# Patient Record
Sex: MALE | Race: ASIAN | NOT HISPANIC OR LATINO | Employment: FULL TIME | URBAN - METROPOLITAN AREA
[De-identification: names, ages, dates, MRNs, and addresses within clinical notes are randomized per-mention and may not be internally consistent; named-entity substitution may affect disease eponyms.]

---

## 2017-01-05 ENCOUNTER — COMMUNICATION - HEALTHEAST (OUTPATIENT)
Dept: INTERNAL MEDICINE | Facility: CLINIC | Age: 52
End: 2017-01-05

## 2017-01-05 DIAGNOSIS — J45.21 MILD INTERMITTENT ASTHMA WITH EXACERBATION: ICD-10-CM

## 2017-04-18 ENCOUNTER — COMMUNICATION - HEALTHEAST (OUTPATIENT)
Dept: INTERNAL MEDICINE | Facility: CLINIC | Age: 52
End: 2017-04-18

## 2017-04-18 DIAGNOSIS — J45.21 MILD INTERMITTENT ASTHMA WITH EXACERBATION: ICD-10-CM

## 2017-04-20 ENCOUNTER — COMMUNICATION - HEALTHEAST (OUTPATIENT)
Dept: INTERNAL MEDICINE | Facility: CLINIC | Age: 52
End: 2017-04-20

## 2017-07-14 ENCOUNTER — COMMUNICATION - HEALTHEAST (OUTPATIENT)
Dept: INTERNAL MEDICINE | Facility: CLINIC | Age: 52
End: 2017-07-14

## 2017-07-14 DIAGNOSIS — J45.21 MILD INTERMITTENT ASTHMA WITH EXACERBATION: ICD-10-CM

## 2017-10-02 ENCOUNTER — OFFICE VISIT - HEALTHEAST (OUTPATIENT)
Dept: INTERNAL MEDICINE | Facility: CLINIC | Age: 52
End: 2017-10-02

## 2017-10-02 ENCOUNTER — COMMUNICATION - HEALTHEAST (OUTPATIENT)
Dept: INTERNAL MEDICINE | Facility: CLINIC | Age: 52
End: 2017-10-02

## 2017-10-02 DIAGNOSIS — R42 DIZZINESS: ICD-10-CM

## 2017-10-02 DIAGNOSIS — Z00.00 HEALTHCARE MAINTENANCE: ICD-10-CM

## 2017-10-02 DIAGNOSIS — J45.909 ASTHMA: ICD-10-CM

## 2017-10-02 LAB
CHOLEST SERPL-MCNC: 192 MG/DL
FASTING STATUS PATIENT QL REPORTED: YES
HDLC SERPL-MCNC: 42 MG/DL
LDLC SERPL CALC-MCNC: 112 MG/DL
TRIGL SERPL-MCNC: 190 MG/DL

## 2017-10-02 ASSESSMENT — MIFFLIN-ST. JEOR: SCORE: 1571.18

## 2017-12-05 ENCOUNTER — COMMUNICATION - HEALTHEAST (OUTPATIENT)
Dept: INTERNAL MEDICINE | Facility: CLINIC | Age: 52
End: 2017-12-05

## 2017-12-05 DIAGNOSIS — J45.21 MILD INTERMITTENT ASTHMA WITH EXACERBATION: ICD-10-CM

## 2018-02-07 ENCOUNTER — COMMUNICATION - HEALTHEAST (OUTPATIENT)
Dept: INTERNAL MEDICINE | Facility: CLINIC | Age: 53
End: 2018-02-07

## 2018-02-07 DIAGNOSIS — J45.21 MILD INTERMITTENT ASTHMA WITH EXACERBATION: ICD-10-CM

## 2018-02-14 ENCOUNTER — COMMUNICATION - HEALTHEAST (OUTPATIENT)
Dept: INTERNAL MEDICINE | Facility: CLINIC | Age: 53
End: 2018-02-14

## 2018-02-14 DIAGNOSIS — J30.9 ALLERGIC RHINITIS: ICD-10-CM

## 2018-02-14 RX ORDER — CETIRIZINE HYDROCHLORIDE 10 MG/1
TABLET ORAL
Qty: 30 TABLET | Refills: 7 | Status: SHIPPED | OUTPATIENT
Start: 2018-02-14 | End: 2022-03-30

## 2018-08-02 ENCOUNTER — COMMUNICATION - HEALTHEAST (OUTPATIENT)
Dept: INTERNAL MEDICINE | Facility: CLINIC | Age: 53
End: 2018-08-02

## 2018-08-02 DIAGNOSIS — J45.21 MILD INTERMITTENT ASTHMA WITH EXACERBATION: ICD-10-CM

## 2018-11-07 ENCOUNTER — COMMUNICATION - HEALTHEAST (OUTPATIENT)
Dept: INTERNAL MEDICINE | Facility: CLINIC | Age: 53
End: 2018-11-07

## 2018-11-07 DIAGNOSIS — J45.21 MILD INTERMITTENT ASTHMA WITH EXACERBATION: ICD-10-CM

## 2018-11-09 RX ORDER — ALBUTEROL SULFATE 90 UG/1
AEROSOL, METERED RESPIRATORY (INHALATION)
Qty: 18 G | Refills: 1 | Status: SHIPPED | OUTPATIENT
Start: 2018-11-09 | End: 2022-01-06

## 2019-04-22 ENCOUNTER — COMMUNICATION - HEALTHEAST (OUTPATIENT)
Dept: INTERNAL MEDICINE | Facility: CLINIC | Age: 54
End: 2019-04-22

## 2019-04-22 DIAGNOSIS — J45.21 MILD INTERMITTENT ASTHMA WITH EXACERBATION: ICD-10-CM

## 2019-11-04 ENCOUNTER — OFFICE VISIT - HEALTHEAST (OUTPATIENT)
Dept: INTERNAL MEDICINE | Facility: CLINIC | Age: 54
End: 2019-11-04

## 2019-11-04 ENCOUNTER — HOSPITAL ENCOUNTER (OUTPATIENT)
Dept: CT IMAGING | Facility: CLINIC | Age: 54
Discharge: HOME | End: 2019-11-04
Attending: INTERNAL MEDICINE

## 2019-11-04 DIAGNOSIS — R10.9 FLANK PAIN: ICD-10-CM

## 2019-11-04 DIAGNOSIS — Z12.11 SCREEN FOR COLON CANCER: ICD-10-CM

## 2019-11-04 ASSESSMENT — MIFFLIN-ST. JEOR: SCORE: 1598.4

## 2019-12-11 ENCOUNTER — COMMUNICATION - HEALTHEAST (OUTPATIENT)
Dept: INTERNAL MEDICINE | Facility: CLINIC | Age: 54
End: 2019-12-11

## 2019-12-11 DIAGNOSIS — J45.21 MILD INTERMITTENT ASTHMA WITH EXACERBATION: ICD-10-CM

## 2020-08-19 ENCOUNTER — COMMUNICATION - HEALTHEAST (OUTPATIENT)
Dept: INTERNAL MEDICINE | Facility: CLINIC | Age: 55
End: 2020-08-19

## 2020-08-19 DIAGNOSIS — J45.21 MILD INTERMITTENT ASTHMA WITH EXACERBATION: ICD-10-CM

## 2020-09-21 ENCOUNTER — COMMUNICATION - HEALTHEAST (OUTPATIENT)
Dept: INTERNAL MEDICINE | Facility: CLINIC | Age: 55
End: 2020-09-21

## 2020-09-21 DIAGNOSIS — J45.21 MILD INTERMITTENT ASTHMA WITH EXACERBATION: ICD-10-CM

## 2021-02-15 ENCOUNTER — COMMUNICATION - HEALTHEAST (OUTPATIENT)
Dept: INTERNAL MEDICINE | Facility: CLINIC | Age: 56
End: 2021-02-15

## 2021-02-15 DIAGNOSIS — J45.21 MILD INTERMITTENT ASTHMA WITH EXACERBATION: ICD-10-CM

## 2021-05-28 NOTE — TELEPHONE ENCOUNTER
RN cannot approve Refill Request    RN can NOT refill this medication med is not covered by policy/route to provider     . Last office visit: 10/2/2017 Ancelmo Multani MD Last Physical: Visit date not found Last MTM visit: Visit date not found Last visit same specialty: 10/2/2017 Ancelmo Multani MD.  Next visit within 3 mo: Visit date not found  Next physical within 3 mo: Visit date not found      Nicol Rizzo, [facility] Triage/Med Refill 4/23/2019    Requested Prescriptions   Pending Prescriptions Disp Refills     ADVAIR DISKUS 250-50 mcg/dose DISKUS [Pharmacy Med Name: ADVAIR DISKUS 250/50MCG (YELLOW) 60]  0     Sig: INHALE 1 PUFF 2 TIMES A DAY.       There is no refill protocol information for this order

## 2021-05-29 ENCOUNTER — RECORDS - HEALTHEAST (OUTPATIENT)
Dept: ADMINISTRATIVE | Facility: CLINIC | Age: 56
End: 2021-05-29

## 2021-05-30 ENCOUNTER — RECORDS - HEALTHEAST (OUTPATIENT)
Dept: ADMINISTRATIVE | Facility: CLINIC | Age: 56
End: 2021-05-30

## 2021-05-31 VITALS — HEIGHT: 64 IN | WEIGHT: 185 LBS | BODY MASS INDEX: 31.58 KG/M2

## 2021-06-03 VITALS
BODY MASS INDEX: 32.61 KG/M2 | SYSTOLIC BLOOD PRESSURE: 130 MMHG | DIASTOLIC BLOOD PRESSURE: 80 MMHG | OXYGEN SATURATION: 98 % | HEIGHT: 64 IN | WEIGHT: 191 LBS | HEART RATE: 66 BPM

## 2021-06-03 NOTE — PROGRESS NOTES
[facility] Follow Up Note    Tyrone Lopez   54 y.o. male    Date of Visit: 11/4/2019    Chief Complaint   Patient presents with     Flank Pain     thinks he might kidney stones     Subjective  This is a 54-year-old man whose overall health is generally been stable.  He has not been in in a while.  He does have a history of kidney stones.  He comes in today because of about a 2-week history of some bilateral flank pain.  Worse on the right than on the left.  The symptoms are intermittent.  He denies any fever or chills.  He has not noticed any blood in the urine.  The symptoms are not necessarily getting worse but they are not improving.  He is concerned about a recurrence of stones.  He has otherwise been feeling in his usual state of health.    ROS A comprehensive review of systems was performed and was otherwise negative    Medications, allergies, and problem list were reviewed and updated    Exam  General Appearance:   On examination his blood pressure is 130/80.  Weight is 191 pounds and height is 64 inches.  BMI is 33.04.    Heart rhythm is stable with rate of 66 and no ectopy.    No palpable tenderness along the flanks or abdomen at this point.  No abdominal distention.    The patient is alert and oriented x3.      Assessment/Plan  1. Screen for colon cancer  Ambulatory referral for Colonoscopy   2. Flank pain  CT Abdomen Pelvis Without Oral Without IV Contrast     Bilateral flank pain.  Given his history and lack of other symptoms I thought we would go ahead with a CT stone run to exclude any new stones.  I will follow-up with him regarding these results.  Body Mass Index was not assessed due to Patient was in with an acute medical issue..    Ancelmo Multani MD      Current Outpatient Medications on File Prior to Visit   Medication Sig     ADVAIR DISKUS 250-50 mcg/dose DISKUS INHALE 1 PUFF 2 TIMES A DAY.     cetirizine (ZYRTEC) 10 MG tablet TAKE 1 TABLET BY MOUTH DAILY     VENTOLIN HFA 90  mcg/actuation inhaler INHALE 2 PUFFS EVERY 4 (FOUR) HOURS AS NEEDED FOR WHEEZING. -MEDARDO BRYAN, RN     No current facility-administered medications on file prior to visit.      No Known Allergies  Social History     Tobacco Use     Smoking status: Never Smoker     Smokeless tobacco: Never Used   Substance Use Topics     Alcohol use: No     Drug use: No

## 2021-06-04 NOTE — TELEPHONE ENCOUNTER
RN cannot approve Refill Request    RN can NOT refill this medication med is not covered by policy/route to provider. Last office visit: 11/4/2019 Ancelmo Multani MD Last Physical: Visit date not found Last MTM visit: Visit date not found Last visit same specialty: 11/4/2019 Ancelmo Multani MD.  Next visit within 3 mo: Visit date not found  Next physical within 3 mo: Visit date not found      Violeta Garcia, [facility] Triage/Med Refill 12/11/2019    Requested Prescriptions   Pending Prescriptions Disp Refills     ADVAIR DISKUS 250-50 mcg/dose DISKUS [Pharmacy Med Name: ADVAIR DISKUS 250/50MCG (YELLOW) 60]  0     Sig: INHALE 1 PUFF BY MOUTH TWICE DAILY       There is no refill protocol information for this order

## 2021-06-10 NOTE — TELEPHONE ENCOUNTER
RN cannot approve Refill Request    RN can NOT refill this medication med is not covered by policy/route to provider. Last office visit: 11/4/2019 Ancelmo Multani MD Last Physical: Visit date not found Last MTM visit: Visit date not found Last visit same specialty: 11/4/2019 Ancelmo Multani MD.  Next visit within 3 mo: Visit date not found  Next physical within 3 mo: Visit date not found      Farrah Alas, [facility] Triage/Med Refill 8/21/2020    Requested Prescriptions   Pending Prescriptions Disp Refills     ADVAIR DISKUS 250-50 mcg/dose DISKUS [Pharmacy Med Name: ADVAIR DISKUS 250/50MCG (YELLOW) 60] 60 each 0     Sig: INHALE 1 PUFF BY MOUTH TWICE DAILY       There is no refill protocol information for this order

## 2021-06-11 NOTE — TELEPHONE ENCOUNTER
RN cannot approve Refill Request    RN can NOT refill this medication med is not covered by policy/route to provider. Last office visit: 11/4/2019 Ancelmo Multani MD Last Physical: Visit date not found Last MTM visit: Visit date not found Last visit same specialty: 11/4/2019 Ancelmo Multani MD.  Next visit within 3 mo: Visit date not found  Next physical within 3 mo: Visit date not found      Nicol Rizzo, [facility] Triage/Med Refill 9/22/2020    Requested Prescriptions   Pending Prescriptions Disp Refills     ADVAIR DISKUS 250-50 mcg/dose DISKUS [Pharmacy Med Name: ADVAIR DISKUS 250/50MCG (YELLOW) 60] 60 each 0     Sig: INHALE 1 PUFF BY MOUTH TWICE DAILY       There is no refill protocol information for this order
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
## 2021-06-15 NOTE — TELEPHONE ENCOUNTER
RN cannot approve Refill Request    RN can NOT refill this medication PCP messaged that patient is overdue for Office Visit. Last office visit: 11/4/2019 Ancelmo Multani MD Last Physical: Visit date not found Last MTM visit: Visit date not found Last visit same specialty: 11/4/2019 Ancelmo Multani MD.  Next visit within 3 mo: Visit date not found  Next physical within 3 mo: Visit date not found      Cathy Gavin, [facility] Triage/Med Refill 2/15/2021    Requested Prescriptions   Pending Prescriptions Disp Refills     fluticasone propion-salmeteroL (ADVAIR DISKUS) 250-50 mcg/dose DISKUS 60 each 3     Sig: Inhale 1 puff 2 (two) times a day.       Asthma Medications Refill Protocol Failed - 2/15/2021  9:44 AM        Failed - PCP or prescribing provider visit in last year     Last office visit with prescriber/PCP: 11/4/2019 Ancelmo Multani MD OR same dept: Visit date not found OR same specialty: 11/4/2019 Ancelmo Multani MD  Last physical: Visit date not found Last MTM visit: Visit date not found    Next appt within 3 mo: Visit date not found Next physical within 3 mo: Visit date not found  Prescriber OR PCP: Ancelmo Multani MD  Last diagnosis associated with med order: 1. Mild intermittent asthma with exacerbation  - fluticasone propion-salmeteroL (ADVAIR DISKUS) 250-50 mcg/dose DISKUS; Inhale 1 puff 2 (two) times a day.  Dispense: 60 each; Refill: 3    If protocol passes may refill for 6 months if within 3 months of last provider visit (or a total of 9 months).

## 2021-06-15 NOTE — TELEPHONE ENCOUNTER
I am routing this to the correct clinic. [facility] refill pool as they are covering Dr. Nerissa Ruvalcaba. I do not know what provider to send this to.

## 2021-06-15 NOTE — TELEPHONE ENCOUNTER
Former patient of Nerissa & has not established care with another provider.  Please assign refill request to covering provider per Clinic standard process.      RN cannot approve Refill Request    RN can NOT refill this medication Protocol failed and NO refill given and no pcp. Last office visit: 11/4/2019 Ancelmo Multani MD Last Physical: Visit date not found Last MTM visit: Visit date not found Last visit same specialty: 11/4/2019 Ancelmo Multani MD.  Next visit within 3 mo: Visit date not found  Next physical within 3 mo: Visit date not found      Darwin Dotson, [facility] Connection Triage/Med Refill 2/15/2021    Requested Prescriptions   Pending Prescriptions Disp Refills     fluticasone propion-salmeteroL (ADVAIR DISKUS) 250-50 mcg/dose DISKUS 60 each 3     Sig: Inhale 1 puff 2 (two) times a day.       Asthma Medications Refill Protocol Failed - 2/15/2021  1:14 PM        Failed - PCP or prescribing provider visit in last year     Last office visit with prescriber/PCP: 11/4/2019 Ancelmo Multani MD OR same dept: Visit date not found OR same specialty: 11/4/2019 Ancelmo Multani MD  Last physical: Visit date not found Last MTM visit: Visit date not found    Next appt within 3 mo: Visit date not found Next physical within 3 mo: Visit date not found  Prescriber OR PCP: Ancelmo Multani MD  Last diagnosis associated with med order: 1. Mild intermittent asthma with exacerbation  - fluticasone propion-salmeteroL (ADVAIR DISKUS) 250-50 mcg/dose DISKUS; Inhale 1 puff 2 (two) times a day.  Dispense: 60 each; Refill: 3    If protocol passes may refill for 6 months if within 3 months of last provider visit (or a total of 9 months).

## 2022-01-06 ENCOUNTER — OFFICE VISIT (OUTPATIENT)
Dept: INTERNAL MEDICINE | Facility: CLINIC | Age: 57
End: 2022-01-06
Payer: COMMERCIAL

## 2022-01-06 VITALS
HEIGHT: 65 IN | HEART RATE: 73 BPM | WEIGHT: 201 LBS | DIASTOLIC BLOOD PRESSURE: 80 MMHG | BODY MASS INDEX: 33.49 KG/M2 | OXYGEN SATURATION: 98 % | SYSTOLIC BLOOD PRESSURE: 128 MMHG

## 2022-01-06 DIAGNOSIS — J45.20 MILD INTERMITTENT ASTHMA WITHOUT COMPLICATION: Primary | ICD-10-CM

## 2022-01-06 DIAGNOSIS — Z23 NEED FOR INFLUENZA VACCINATION: ICD-10-CM

## 2022-01-06 DIAGNOSIS — Z13.220 LIPID SCREENING: ICD-10-CM

## 2022-01-06 DIAGNOSIS — J45.21 MILD INTERMITTENT ASTHMA WITH EXACERBATION: ICD-10-CM

## 2022-01-06 LAB
ALBUMIN SERPL-MCNC: 4.2 G/DL (ref 3.5–5)
ALP SERPL-CCNC: 86 U/L (ref 45–120)
ALT SERPL W P-5'-P-CCNC: 33 U/L (ref 0–45)
ANION GAP SERPL CALCULATED.3IONS-SCNC: 11 MMOL/L (ref 5–18)
AST SERPL W P-5'-P-CCNC: 23 U/L (ref 0–40)
BILIRUB SERPL-MCNC: 1 MG/DL (ref 0–1)
BUN SERPL-MCNC: 19 MG/DL (ref 8–22)
CALCIUM SERPL-MCNC: 9.5 MG/DL (ref 8.5–10.5)
CHLORIDE BLD-SCNC: 106 MMOL/L (ref 98–107)
CHOLEST SERPL-MCNC: 231 MG/DL
CO2 SERPL-SCNC: 25 MMOL/L (ref 22–31)
CREAT SERPL-MCNC: 0.99 MG/DL (ref 0.7–1.3)
FASTING STATUS PATIENT QL REPORTED: YES
GFR SERPL CREATININE-BSD FRML MDRD: 88 ML/MIN/1.73M2
GLUCOSE BLD-MCNC: 104 MG/DL (ref 70–125)
HDLC SERPL-MCNC: 46 MG/DL
LDLC SERPL CALC-MCNC: 152 MG/DL
POTASSIUM BLD-SCNC: 4.5 MMOL/L (ref 3.5–5)
PROT SERPL-MCNC: 7.7 G/DL (ref 6–8)
SODIUM SERPL-SCNC: 142 MMOL/L (ref 136–145)
TRIGL SERPL-MCNC: 166 MG/DL

## 2022-01-06 PROCEDURE — 99213 OFFICE O/P EST LOW 20 MIN: CPT | Mod: 25 | Performed by: INTERNAL MEDICINE

## 2022-01-06 PROCEDURE — 80053 COMPREHEN METABOLIC PANEL: CPT | Performed by: INTERNAL MEDICINE

## 2022-01-06 PROCEDURE — 90682 RIV4 VACC RECOMBINANT DNA IM: CPT | Performed by: INTERNAL MEDICINE

## 2022-01-06 PROCEDURE — 90471 IMMUNIZATION ADMIN: CPT | Performed by: INTERNAL MEDICINE

## 2022-01-06 PROCEDURE — 36415 COLL VENOUS BLD VENIPUNCTURE: CPT | Performed by: INTERNAL MEDICINE

## 2022-01-06 PROCEDURE — 80061 LIPID PANEL: CPT | Performed by: INTERNAL MEDICINE

## 2022-01-06 RX ORDER — ALBUTEROL SULFATE 90 UG/1
1-2 AEROSOL, METERED RESPIRATORY (INHALATION) EVERY 6 HOURS PRN
Qty: 18 G | Refills: 3 | Status: SHIPPED | OUTPATIENT
Start: 2022-01-06 | End: 2022-04-20

## 2022-01-06 ASSESSMENT — ASTHMA QUESTIONNAIRES
QUESTION_2 LAST FOUR WEEKS HOW OFTEN HAVE YOU HAD SHORTNESS OF BREATH: NOT AT ALL
QUESTION_1 LAST FOUR WEEKS HOW MUCH OF THE TIME DID YOUR ASTHMA KEEP YOU FROM GETTING AS MUCH DONE AT WORK, SCHOOL OR AT HOME: NONE OF THE TIME
QUESTION_3 LAST FOUR WEEKS HOW OFTEN DID YOUR ASTHMA SYMPTOMS (WHEEZING, COUGHING, SHORTNESS OF BREATH, CHEST TIGHTNESS OR PAIN) WAKE YOU UP AT NIGHT OR EARLIER THAN USUAL IN THE MORNING: NOT AT ALL
QUESTION_4 LAST FOUR WEEKS HOW OFTEN HAVE YOU USED YOUR RESCUE INHALER OR NEBULIZER MEDICATION (SUCH AS ALBUTEROL): NOT AT ALL
QUESTION_5 LAST FOUR WEEKS HOW WOULD YOU RATE YOUR ASTHMA CONTROL: COMPLETELY CONTROLLED
ACT_TOTALSCORE: 25

## 2022-01-06 ASSESSMENT — MIFFLIN-ST. JEOR: SCORE: 1650.67

## 2022-01-06 NOTE — LETTER
January 7, 2022      Tyrone Lopez  [address] [place] [address]      Tyrone,   It was good to meet you.   Your blood tests show a high cholesterol level. I would recommend you work on getting more regular exercise to help bring this level down as it is a risk factor for heart disease and stroke. As we discussed in clinic, I would recommend that you see me again in 6 months for a full physical.   Please contact me if you have any questions related to these results       Resulted Orders   Lipid panel reflex to direct LDL Fasting   Result Value Ref Range    Cholesterol 231 (H) <=199 mg/dL    Triglycerides 166 (H) <=149 mg/dL    Direct Measure HDL 46 >=40 mg/dL      Comment:      HDL Cholesterol Reference Range:     0-2 years:   No reference ranges established for patients under 2 years old  at [facility] for lipid analytes.    2-8 years:  Greater than 45 mg/dL     18 years and older:   Female: Greater than or equal to 50 mg/dL   Male:   Greater than or equal to 40 mg/dL    LDL Cholesterol Calculated 152 (H) <=129 mg/dL    Patient Fasting > 8hrs? Yes    Comprehensive metabolic panel   Result Value Ref Range    Sodium 142 136 - 145 mmol/L    Potassium 4.5 3.5 - 5.0 mmol/L    Chloride 106 98 - 107 mmol/L    Carbon Dioxide (CO2) 25 22 - 31 mmol/L    Anion Gap 11 5 - 18 mmol/L    Urea Nitrogen 19 8 - 22 mg/dL    Creatinine 0.99 0.70 - 1.30 mg/dL    Calcium 9.5 8.5 - 10.5 mg/dL    Glucose 104 70 - 125 mg/dL    Alkaline Phosphatase 86 45 - 120 U/L    AST 23 0 - 40 U/L    ALT 33 0 - 45 U/L    Protein Total 7.7 6.0 - 8.0 g/dL    Albumin 4.2 3.5 - 5.0 g/dL    Bilirubin Total 1.0 0.0 - 1.0 mg/dL    GFR Estimate 88 >60 mL/min/1.73m2      Comment:      Effective December 21, 2021 eGFRcr in adults is calculated using the 2021 CKD-EPI creatinine equation which includes age and gender (Jame et al., NEJM, DOI: 10.1056/TAGHrq1943019)       If you have any questions or concerns, please call the clinic at the number listed  above.       Sincerely,      Oleg Bañuelos MD

## 2022-01-06 NOTE — PROGRESS NOTES
"  Assessment & Plan     (J45.20) Mild intermittent asthma without complication  (primary encounter diagnosis)  Comment: stable  Plan: fluticasone-salmeterol (ADVAIR) 250-50 MCG/DOSE        inhaler, Comprehensive metabolic panel        Poor understanding of his inhalers. Discussed in detail today. Will use advair more regularly if he feels that his sx are daily. Otherwise, given his ACT score, likely just needs the rescue. This was renewed.    Flu shot given.  Not interested in other vaccines  Discussed colon cancer screening though he was not interested. He will think about the stool testing. Recommended physical in 6 months.     BMI:   Estimated body mass index is 33.97 kg/m  as calculated from the following:    Height as of this encounter: 1.638 m (5' 4.5\").    Weight as of this encounter: 91.2 kg (201 lb).   Weight management plan: Discussed healthy diet and exercise guidelines    Return in about 6 months (around 7/6/2022) for Routine preventive, with me.    Oleg Bañuelos MD  [facility]   Tyrone is a 56 year old who presents for the following health issues   Establish care. Needs refills for his asthma which he feels is well control  Occasional pertinent sx when he is outside and active. The inhaler helps though he refers to the advair as his as needed inhaler.  HPI   Objective    /80   Pulse 73   Ht 1.638 m (5' 4.5\")   Wt 91.2 kg (201 lb)   SpO2 98%   BMI 33.97 kg/m    Body mass index is 33.97 kg/m .  Physical Exam   Gen:nad          "

## 2022-01-07 ASSESSMENT — ASTHMA QUESTIONNAIRES: ACT_TOTALSCORE: 25

## 2022-02-16 ENCOUNTER — OFFICE VISIT (OUTPATIENT)
Dept: INTERNAL MEDICINE | Facility: CLINIC | Age: 57
End: 2022-02-16
Payer: COMMERCIAL

## 2022-02-16 ENCOUNTER — ANCILLARY PROCEDURE (OUTPATIENT)
Dept: GENERAL RADIOLOGY | Facility: CLINIC | Age: 57
End: 2022-02-16
Attending: INTERNAL MEDICINE
Payer: COMMERCIAL

## 2022-02-16 VITALS
HEIGHT: 65 IN | BODY MASS INDEX: 33.49 KG/M2 | DIASTOLIC BLOOD PRESSURE: 78 MMHG | HEART RATE: 82 BPM | SYSTOLIC BLOOD PRESSURE: 130 MMHG | OXYGEN SATURATION: 95 % | WEIGHT: 201 LBS

## 2022-02-16 DIAGNOSIS — R06.02 SHORTNESS OF BREATH: ICD-10-CM

## 2022-02-16 DIAGNOSIS — Z12.11 SCREEN FOR COLON CANCER: ICD-10-CM

## 2022-02-16 DIAGNOSIS — R06.02 SHORTNESS OF BREATH: Primary | ICD-10-CM

## 2022-02-16 DIAGNOSIS — Z11.4 SCREENING FOR HIV (HUMAN IMMUNODEFICIENCY VIRUS): ICD-10-CM

## 2022-02-16 DIAGNOSIS — Z11.59 NEED FOR HEPATITIS C SCREENING TEST: ICD-10-CM

## 2022-02-16 DIAGNOSIS — J45.901 MODERATE ASTHMA WITH EXACERBATION, UNSPECIFIED WHETHER PERSISTENT: ICD-10-CM

## 2022-02-16 PROCEDURE — 71046 X-RAY EXAM CHEST 2 VIEWS: CPT | Mod: TC | Performed by: RADIOLOGY

## 2022-02-16 PROCEDURE — 99214 OFFICE O/P EST MOD 30 MIN: CPT | Performed by: INTERNAL MEDICINE

## 2022-02-16 RX ORDER — PREDNISONE 20 MG/1
40 TABLET ORAL DAILY
Qty: 10 TABLET | Refills: 0 | Status: SHIPPED | OUTPATIENT
Start: 2022-02-16 | End: 2022-02-21

## 2022-02-16 NOTE — PROGRESS NOTES
"  Assessment & Plan     (R06.02) Shortness of breath  (primary encounter diagnosis)  Comment: based on exam/wheeze, suspect asthma exac. Did have COVID 4 weeks ago so this was the likely trigger  Plan: XR Chest 2 Views, predniSONE (DELTASONE) 20 MG         tablet          Will add oral steroid but strongly recommended ED visit if sx did not improve in the next 24 hours. CXR pending, will contact him later this afternoon with results and sx update.    Return in about 1 day (around 2/17/2022), or if symptoms worsen or fail to improve.    Oleg Bañuelos MD  [facility]    Subjective   Tyrone is a 56 year old who presents for the following health issues     HPI   Pt is here due to 2 weeks of increased wheezing and shortness of breath. He mentions that he had contracted COVID 4-5 weeks ago and has not fully recovered.  Uses Advair only once a day for his asthma. He has used his rescue inhaler as well recently up to every 4 hours.  Denies overt chest pain, nausea, sweats.        Objective    /78 (BP Location: Left arm, Patient Position: Sitting, Cuff Size: Adult Small)   Pulse 82   Ht 1.638 m (5' 4.5\")   Wt 91.2 kg (201 lb)   SpO2 95%   BMI 33.97 kg/m    Body mass index is 33.97 kg/m .  Physical Exam   GENERAL: healthy, alert and no distress  RESP: diffuse wheezes.  CV: regular rate and rhythm, normal S1 S2, no S3 or S4, no murmur, click or rub, no peripheral edema and peripheral pulses strong  NEURO: Normal strength and tone, mentation intact and speech normal    "

## 2022-02-17 ENCOUNTER — TELEPHONE (OUTPATIENT)
Dept: PEDIATRICS | Facility: CLINIC | Age: 57
End: 2022-02-17
Payer: COMMERCIAL

## 2022-02-17 NOTE — TELEPHONE ENCOUNTER
----- Message from Oleg Bañuelos MD sent at 2/17/2022 11:26 AM CST -----  Please call the patient with the following information:    The cxr is normal  Please check on his breathing today  Thank you

## 2022-02-17 NOTE — TELEPHONE ENCOUNTER
"Contacted patient via [employer] .  Patient able to respond in English.  Informed chest x-ray is clear.  He states his breathing is \"much much better.\"  He is not back to baseline, but feels he is getting close and \"its only been a couple days.\"  Advised if he doesn't continue to improve or has any other concerns to call the clinic.  Patient verbalized understanding.  "

## 2022-03-18 ENCOUNTER — NURSE TRIAGE (OUTPATIENT)
Dept: INTERNAL MEDICINE | Facility: CLINIC | Age: 57
End: 2022-03-18
Payer: COMMERCIAL

## 2022-03-18 NOTE — TELEPHONE ENCOUNTER
Daughter called back in and she is requesting this med prior to appt on 3/30.  Pt is having a hard time breathing and I transferred to Triage as I thought this was not we would refill.

## 2022-03-18 NOTE — TELEPHONE ENCOUNTER
Reason for Call:  Medication or medication refill:    Do you use a [facility]?  Name of the pharmacy and phone number for the current request:    [facility] [address]  - [facility]    Name of the medication requested:   Prednisone 20mg    Other request: n/a    Can we leave a detailed message on this number? YES    Phone number patient can be reached at: Other phone number:  [phone]    Best Time: anytime    Call taken on 3/18/2022 at 1:29 PM by Kalli Ambrocio

## 2022-03-23 NOTE — TELEPHONE ENCOUNTER
RN called TC Aliya to follow up on message. Aliya will make sure Dr. Scott (who is covering PCP today) and care team are informed and will address message.     Cathy Gavin RN, BSN Nurse Triage Advisor 10:26 AM 3/23/2022

## 2022-03-23 NOTE — TELEPHONE ENCOUNTER
RN calling over to TC at [facility] to see if any openings available today in the office first. No openings at clinic today per TC at [facility].     Nahed : RN called patient back to inform him via  that [facility] is advised based on covering providers recommendations below. At this time patient declines providers recommendations and patient will wait to be seen until 3/30/22 appointment. Patient was advised we are a 24/7 nurse line and to call back with any new or worsening sx. Patient verbalized understanding and agrees with plan.    Cathy Gavin RN, BSN Nurse Triage Advisor 10:45 AM 3/23/2022

## 2022-03-23 NOTE — TELEPHONE ENCOUNTER
Nurse Triage SBAR    Is this a 2nd Level Triage? YES, LICENSED PRACTITIONER REVIEW IS REQUIRED    Situation: Nahed  [id number]: RN called patient to triage symptoms.   RN spoke directly with patient on the phone via . Daughter called earlier today regarding patients symptoms and requesting Prednisone.       Background: Patient was having breathing difficulties starting last Thursday. Patient has a history of asthma. Patient had a cough last week with the breathing difficulties last Thursday.     Assessment:     Cough present in the early mornings and late at night (history of asthma).  With activity (walking, carrying something, climb stairs) patient will feel short of breath intermittently. Mild SOB.  Wheezing present intermittently (related to his asthma).  Denies any breathing difficulties at rest (moderate).   Denies any chest pain currently.   Denies any fevers    Recommendation: Per protocol, recommendations are for patient to go to the office now.     Patient would like PCP to advise on his symptoms and give recommendations at this time.  Patient feels that he can wait until appointment on 3/30/22 as his symptoms have been better in the last two days, but would also like PCP to advise on with recommendations.     Pharmacy pended if needed. Patient advised to call back if he develops any new or worsening sx and was advised to let his daughter know that he spoke with a triage nurse regarding symptoms. Patient verbalized understanding and agrees with plan.       Call patient back at: [phone]  Okay to leave a detailed message? YES    Routed to provider    Does the patient meet one of the following criteria for ADS visit consideration? 16+ years old, with an [facility] PCP     TIP  Providers, please consider if this condition is appropriate for management at one of our Acute and Diagnostic Services sites.     If patient is a good candidate, please use dotphrase <dot>triageresponse and select Refer  "to ADS to document.    Reason for Disposition    MILD difficulty breathing (e.g., minimal/no SOB at rest, SOB with walking, pulse < 100) of new onset or worse than normal    Additional Information    Negative: Choking on something    Negative: Breathing stopped and hasn't returned    Negative: SEVERE difficulty breathing (e.g., struggling for each breath, speaks in single words, pulse > 120)    Negative: Bluish (or gray) lips or face    Negative: Difficult to awaken or acting confused (e.g., disoriented, slurred speech)    Negative: Passed out (i.e., fainted, collapsed and was not responding)    Negative: Wheezing started suddenly after medicine, an allergic food, or bee sting    Negative: Stridor    Negative: Slow, shallow and weak breathing    Negative: Sounds like a life-threatening emergency to the triager    Negative: Chest pain    Negative: Wheezing (high pitched whistling sound) and previous asthma attacks or use of asthma medicines    Negative: Difficulty breathing and only present when coughing    Negative: Difficulty breathing and only from stuffy or runny nose    Negative: Difficulty breathing and within 14 days of COVID-19 Exposure    Negative: MODERATE difficulty breathing (e.g., speaks in phrases, SOB even at rest, pulse 100-120) of new onset or worse than normal    Negative: Wheezing can be heard across the room    Negative: Drooling or spitting out saliva (because can't swallow)    Negative: Any history of prior \"blood clot\" in leg or lungs    Negative: Illness requiring prolonged bedrest in past month (e.g., immobilization, long hospital stay)    Negative: Hip or leg fracture (broken bone) in past month (or had cast on leg or ankle in past month)    Negative: Major surgery in the past month    Negative: Long-distance travel in past month (e.g., car, bus, train, plane; with trip lasting 6 or more hours)    Negative: Extra heart beats OR irregular heart beating   (i.e., \"palpitations\")    Negative: " Fever > 103 F (39.4 C)    Negative: Fever > 101 F (38.3 C) and over 60 years of age    Negative: Fever > 100.0 F (37.8 C) and bedridden (e.g., nursing home patient, stroke, chronic illness, recovering from surgery)    Negative: Fever > 100.0 F (37.8 C) and diabetes mellitus or weak immune system (e.g., HIV positive, cancer chemo, splenectomy, organ transplant, chronic steroids)    Negative: Periods where breathing stops and then resumes normally and bedridden (e.g., nursing home patient, CVA)    Negative: Pregnant or postpartum (from 0 to 6 weeks after delivery)    Negative: Patient sounds very sick or weak to the triager    Protocols used: BREATHING DIFFICULTY-A-OH    COVID 19 Nurse Triage Plan/Patient Instructions    Please be aware that novel coronavirus (COVID-19) may be circulating in the community. If you develop symptoms such as fever, cough, or SOB or if you have concerns about the presence of another infection including coronavirus (COVID-19), please contact your health care provider or visit [email or web address].     Disposition/Instructions    In-Person Visit with provider recommended. Reference Visit Selection Guide.    Thank you for taking steps to prevent the spread of this virus.  o Limit your contact with others.  o Wear a simple mask to cover your cough.  o Wash your hands well and often.    Resources    [facility]: About COVID-19: [email or web address]    CDC: What to Do If You're Sick: www.cdc.gov/coronavirus/2019-ncov/about/steps-when-sick.html    CDC: Ending Home Isolation: www.cdc.gov/coronavirus/2019-ncov/hcp/disposition-in-home-patients.html     CDC: Caring for Someone: www.cdc.gov/coronavirus/2019-ncov/if-you-are-sick/care-for-someone.html     [facility]: Interim Guidance for Hospital Discharge to Home: www.health.[facility].mn.us/diseases/coronavirus/hcp/hospdischarge.pdf    [facility] clinical trials (COVID-19 research studies):  clinicalaffairs.[email or web address]-clinical-trials     Below are the COVID-19 hotlines at the [place] [facility] ([facility]). Interpreters are available.   o For health questions: Call 522-740-9500 or 1-826.802.2110 (7 a.m. to 7 p.m.)  o For questions about schools and childcare: Call 304-467-1310 or 1-592.827.2885 (7 a.m. to 7 p.m.)

## 2022-03-30 ENCOUNTER — OFFICE VISIT (OUTPATIENT)
Dept: INTERNAL MEDICINE | Facility: CLINIC | Age: 57
End: 2022-03-30
Payer: COMMERCIAL

## 2022-03-30 VITALS
WEIGHT: 198.9 LBS | OXYGEN SATURATION: 97 % | SYSTOLIC BLOOD PRESSURE: 132 MMHG | BODY MASS INDEX: 33.61 KG/M2 | TEMPERATURE: 97.8 F | DIASTOLIC BLOOD PRESSURE: 80 MMHG | HEART RATE: 74 BPM

## 2022-03-30 DIAGNOSIS — J45.901 MODERATE ASTHMA WITH EXACERBATION, UNSPECIFIED WHETHER PERSISTENT: Primary | ICD-10-CM

## 2022-03-30 DIAGNOSIS — J30.2 SEASONAL ALLERGIC RHINITIS, UNSPECIFIED TRIGGER: ICD-10-CM

## 2022-03-30 PROCEDURE — 99214 OFFICE O/P EST MOD 30 MIN: CPT | Performed by: INTERNAL MEDICINE

## 2022-03-30 RX ORDER — PREDNISONE 10 MG/1
TABLET ORAL
Qty: 30 TABLET | Refills: 0 | Status: SHIPPED | OUTPATIENT
Start: 2022-03-30 | End: 2022-04-11

## 2022-03-30 RX ORDER — MONTELUKAST SODIUM 10 MG/1
10 TABLET ORAL AT BEDTIME
Qty: 90 TABLET | Refills: 3 | Status: SHIPPED | OUTPATIENT
Start: 2022-03-30 | End: 2023-09-07

## 2022-03-30 RX ORDER — CETIRIZINE HYDROCHLORIDE 10 MG/1
TABLET ORAL
Qty: 90 TABLET | Refills: 3 | Status: SHIPPED | OUTPATIENT
Start: 2022-03-30 | End: 2024-03-07

## 2022-03-30 NOTE — PROGRESS NOTES
Office Visit - Follow Up   Tyrone Lopez   56 year old male    Date of Visit: 3/30/2022    Chief Complaint   Patient presents with     Asthma Flare     SOB; consistent since appt with pcp in February; some improvement while on prednisone, but sx returned once off prednisone        Assessment and Plan   1. Seasonal allergic rhinitis, unspecified trigger  - cetirizine (ZYRTEC) 10 MG tablet; [CETIRIZINE (ZYRTEC) 10 MG TABLET] TAKE 1 TABLET BY MOUTH DAILY  Dispense: 90 tablet; Refill: 3    2. Moderate asthma with exacerbation, unspecified whether persistent  Continue inhalers which he has been using faithfully.  Add back prednisone which is helpful.  Given the allergic component trial of Singulair as well  - predniSONE (DELTASONE) 10 MG tablet; Take 4 tablets (40 mg) by mouth daily for 3 days, THEN 3 tablets (30 mg) daily for 3 days, THEN 2 tablets (20 mg) daily for 3 days, THEN 1 tablet (10 mg) daily for 3 days.  Dispense: 30 tablet; Refill: 0  - montelukast (SINGULAIR) 10 MG tablet; Take 1 tablet (10 mg) by mouth At Bedtime  Dispense: 90 tablet; Refill: 3      Return in about 4 weeks (around 4/27/2022) for Follow up, visit with PCP.     History of Present Illness   This 56 year old man with a history of asthma which has been fairly well controlled developed Covid earlier this winter and ongoing with shortness of breath and wheezing.  He saw his primary internist Oleg Bañuelos MD and was given some steroids which helped significantly.  However stopping the steroids his symptoms have returned.  No fever or chills.  A little bit better than it was few days ago.  He does have seasonal allergies which have been acting up now.    Review of Systems: A comprehensive review of systems was negative except as noted.     Medications, Allergies and Problem List   Reviewed, reconciled and updated  Post Discharge Medication Reconciliation Status:      Physical Exam   General Appearance:   No acute distress    /80 (BP Location:  Left arm, Patient Position: Sitting, Cuff Size: Adult Regular)   Pulse 74   Temp 97.8  F (36.6  C) (Oral)   Wt 90.2 kg (198 lb 14.4 oz)   SpO2 97%   BMI 33.61 kg/m      Diffuse expiratory wheezing     Additional Information   Current Outpatient Medications   Medication Sig Dispense Refill     albuterol (VENTOLIN HFA) 108 (90 Base) MCG/ACT inhaler Inhale 1-2 puffs into the lungs every 6 hours as needed for shortness of breath / dyspnea or wheezing 18 g 3     cetirizine (ZYRTEC) 10 MG tablet [CETIRIZINE (ZYRTEC) 10 MG TABLET] TAKE 1 TABLET BY MOUTH DAILY 90 tablet 3     fluticasone-salmeterol (ADVAIR) 250-50 MCG/DOSE inhaler Inhale 1 puff into the lungs 2 times daily 60 each 3     montelukast (SINGULAIR) 10 MG tablet Take 1 tablet (10 mg) by mouth At Bedtime 90 tablet 3     predniSONE (DELTASONE) 10 MG tablet Take 4 tablets (40 mg) by mouth daily for 3 days, THEN 3 tablets (30 mg) daily for 3 days, THEN 2 tablets (20 mg) daily for 3 days, THEN 1 tablet (10 mg) daily for 3 days. 30 tablet 0     No Known Allergies  Social History     Tobacco Use     Smoking status: Never Smoker     Smokeless tobacco: Never Used   Substance Use Topics     Alcohol use: No     Drug use: No       Review and/or order of clinical lab tests:  Review and/or order of radiology tests:  Review and/or order of medicine tests:  Discussion of test results with performing physician:  Decision to obtain old records and/or obtain history from someone other than the patient:  Review and summarization of old records and/or obtaining history from someone other than the patient and.or discussion of case with another health care provider:  Independent visualization of image, tracing or specimen itself:    Time:      Ancelmo Marx MD

## 2022-04-20 ENCOUNTER — OFFICE VISIT (OUTPATIENT)
Dept: INTERNAL MEDICINE | Facility: CLINIC | Age: 57
End: 2022-04-20
Payer: COMMERCIAL

## 2022-04-20 VITALS
HEIGHT: 65 IN | HEART RATE: 74 BPM | SYSTOLIC BLOOD PRESSURE: 134 MMHG | DIASTOLIC BLOOD PRESSURE: 88 MMHG | OXYGEN SATURATION: 98 % | WEIGHT: 198.1 LBS | BODY MASS INDEX: 33.01 KG/M2

## 2022-04-20 DIAGNOSIS — J45.901 MODERATE ASTHMA WITH EXACERBATION, UNSPECIFIED WHETHER PERSISTENT: Primary | ICD-10-CM

## 2022-04-20 DIAGNOSIS — Z12.11 SCREEN FOR COLON CANCER: ICD-10-CM

## 2022-04-20 DIAGNOSIS — Z11.4 SCREENING FOR HIV (HUMAN IMMUNODEFICIENCY VIRUS): ICD-10-CM

## 2022-04-20 DIAGNOSIS — Z11.59 NEED FOR HEPATITIS C SCREENING TEST: ICD-10-CM

## 2022-04-20 PROCEDURE — 91305 COVID-19,PF,PFIZER (12+ YRS): CPT | Performed by: INTERNAL MEDICINE

## 2022-04-20 PROCEDURE — 0054A COVID-19,PF,PFIZER (12+ YRS): CPT | Performed by: INTERNAL MEDICINE

## 2022-04-20 PROCEDURE — 99213 OFFICE O/P EST LOW 20 MIN: CPT | Performed by: INTERNAL MEDICINE

## 2022-04-20 RX ORDER — MONTELUKAST SODIUM 10 MG/1
10 TABLET ORAL AT BEDTIME
Qty: 90 TABLET | Refills: 3 | Status: CANCELLED | OUTPATIENT
Start: 2022-04-20

## 2022-04-20 RX ORDER — ALBUTEROL SULFATE 90 UG/1
1-2 AEROSOL, METERED RESPIRATORY (INHALATION) EVERY 6 HOURS PRN
Qty: 18 G | Refills: 3 | Status: SHIPPED | OUTPATIENT
Start: 2022-04-20 | End: 2022-11-22

## 2022-04-20 NOTE — PROGRESS NOTES
"    Assessment & Plan    (J45.901) Moderate asthma with exacerbation, unspecified whether persistent  (primary encounter diagnosis)  Comment: on advair though only taking once daily. expensive  Plan: albuterol (VENTOLIN HFA) 108 (90 Base) MCG/ACT         inhaler        Reviewed with him that this is best used BID. Will check with [facility] about alternative with better price. Will continue singular during more difficult times of year.    (Z12.11) Screen for colon cancer  Comment: declined again today though willing to consider this in the future.  Plan: had family member with complication related to colonoscopy.  May consider stool testing. Will review at his physical this summer.    Return in about 3 months (around 7/20/2022) for Routine preventive, with me.    Oleg Bañuelos MD  [facility]    Subjective   Tyrone Lopez is a 45 year old who presents for the following health issues   HPI   Pt is here for f/up on his breathing. More difficult time with his asthma recently. Another steroid burst recently due to recurrent breathing problems. Sx improved quickly with steroids as they have in the past. No obvious trigger though such as URI, smoking/environment.      Objective    /88 (BP Location: Left arm, Patient Position: Sitting, Cuff Size: Adult Regular)   Pulse 74   Ht 1.638 m (5' 4.5\")   Wt 89.9 kg (198 lb 1.6 oz)   SpO2 98%   BMI 33.48 kg/m     Physical Exam   Gen:nad  Lung:clear         "

## 2022-04-22 DIAGNOSIS — J45.909 ASTHMA: Primary | ICD-10-CM

## 2022-04-22 RX ORDER — FLUTICASONE PROPIONATE AND SALMETEROL 113; 14 UG/1; UG/1
1 POWDER, METERED RESPIRATORY (INHALATION) 2 TIMES DAILY
Qty: 1 EACH | Refills: 11 | Status: SHIPPED | OUTPATIENT
Start: 2022-04-22 | End: 2022-04-25 | Stop reason: ALTCHOICE

## 2022-04-22 NOTE — TELEPHONE ENCOUNTER
Trial of airduo to decrease cost at pharmacy level. If needed, may use [employer] to get airduo at Pending sale to [facility] for about $30.

## 2022-04-25 RX ORDER — BUDESONIDE AND FORMOTEROL FUMARATE DIHYDRATE 160; 4.5 UG/1; UG/1
1 AEROSOL RESPIRATORY (INHALATION) 2 TIMES DAILY
Qty: 10.2 G | Refills: 2 | Status: SHIPPED | OUTPATIENT
Start: 2022-04-25 | End: 2022-11-22

## 2022-04-25 NOTE — TELEPHONE ENCOUNTER
Symbicort will be $60 at [employer].  Using a professional [employer] telephone  attempted to let him know cost of inhaler.  Left voicemail that I will call him back at another time.

## 2022-04-25 NOTE — TELEPHONE ENCOUNTER
Airduo not covered. [facility] pharmacist suggested Symbicort. Will follow up on coverage. If too expensive. Suggest [facility] pharmacy Airduo with [employer] coupon for $30.

## 2022-11-21 DIAGNOSIS — J45.909 ASTHMA: ICD-10-CM

## 2022-11-21 DIAGNOSIS — J45.901 MODERATE ASTHMA WITH EXACERBATION, UNSPECIFIED WHETHER PERSISTENT: ICD-10-CM

## 2022-11-22 RX ORDER — ALBUTEROL SULFATE 90 UG/1
AEROSOL, METERED RESPIRATORY (INHALATION)
Qty: 18 G | Refills: 0 | Status: SHIPPED | OUTPATIENT
Start: 2022-11-22 | End: 2023-01-25

## 2022-11-22 RX ORDER — BUDESONIDE AND FORMOTEROL FUMARATE DIHYDRATE 160; 4.5 UG/1; UG/1
AEROSOL RESPIRATORY (INHALATION)
Qty: 10.2 G | Refills: 0 | Status: SHIPPED | OUTPATIENT
Start: 2022-11-22 | End: 2023-01-25

## 2022-11-22 NOTE — TELEPHONE ENCOUNTER
"Routing refill request to provider for review/approval because:  ACT score out of date/not on file.  Patient needs to be seen because it has been more than 6 months since last office visit.    Last Written Prescription Date:  4/20/22  Last Fill Quantity: 18 g,  # refills: 3   Last office visit provider:  4/20/22     Last Written Prescription Date:  4/25/22  Last Fill Quantity: 10.2 g,  # refills: 2   Last office visit provider:  4/20/22    Requested Prescriptions   Pending Prescriptions Disp Refills     SYMBICORT 160-4.5 MCG/ACT Inhaler [Pharmacy Med Name: SYMBICORT 160/4.5MCG (120 ORAL INH)] 10.2 g 2     Sig: INHALE 1 PUFF INTO THE LUNGS TWICE DAILY       Inhaled Steroids Protocol Failed - 11/22/2022  2:21 PM        Failed - Asthma control assessment score within normal limits in last 6 months     Please review ACT score.           Failed - Recent (6 mo) or future (30 days) visit within the authorizing provider's specialty     Patient had office visit in the last 6 months or has a visit in the next 30 days with authorizing provider or within the authorizing provider's specialty.  See \"Patient Info\" tab in inbasket, or \"Choose Columns\" in Meds & Orders section of the refill encounter.            Passed - Patient is age 12 or older        Passed - Medication is active on med list       Long-Acting Beta Agonist Inhalers Protocol  Failed - 11/22/2022  2:21 PM        Failed - Asthma control assessment score within normal limits in last 6 months     Please review ACT score.           Failed - Recent (6 mo) or future (30 days) visit within the authorizing provider's specialty     Patient had office visit in the last 6 months or has a visit in the next 30 days with authorizing provider or within the authorizing provider's specialty.  See \"Patient Info\" tab in inbasket, or \"Choose Columns\" in Meds & Orders section of the refill encounter.            Passed - Patient is age 12 or older        Passed - Order for Serevent, " "Striverdi, or Foradil and pt has steroid inhaler        Passed - Medication is active on med list           VENTOLIN  (90 Base) MCG/ACT inhaler [Pharmacy Med Name: VENTOLIN HFA INH W/DOS CTR 200PUFFS] 18 g 3     Sig: INHALE 1 TO 2 PUFFS INTO THE LUNGS EVERY 6 HOURS AS NEEDED FOR SHORTNESS OF BREATH OR DIFFICULT BREATHING OR WHEEZING       Asthma Maintenance Inhalers - Anticholinergics Failed - 11/21/2022  1:24 PM        Failed - Asthma control assessment score within normal limits in last 6 months     Please review ACT score.           Failed - Recent (6 mo) or future (30 days) visit within the authorizing provider's specialty     Patient had office visit in the last 6 months or has a visit in the next 30 days with authorizing provider or within the authorizing provider's specialty.  See \"Patient Info\" tab in inbasket, or \"Choose Columns\" in Meds & Orders section of the refill encounter.            Passed - Patient is age 12 years or older        Passed - Medication is active on med list       Short-Acting Beta Agonist Inhalers Protocol  Failed - 11/21/2022  1:24 PM        Failed - Asthma control assessment score within normal limits in last 6 months     Please review ACT score.           Failed - Recent (6 mo) or future (30 days) visit within the authorizing provider's specialty     Patient had office visit in the last 6 months or has a visit in the next 30 days with authorizing provider or within the authorizing provider's specialty.  See \"Patient Info\" tab in inbasket, or \"Choose Columns\" in Meds & Orders section of the refill encounter.            Passed - Patient is age 12 or older        Passed - Medication is active on med list             Darwin Dotson RN 11/22/22 2:21 PM  "

## 2023-01-24 DIAGNOSIS — J45.901 MODERATE ASTHMA WITH EXACERBATION, UNSPECIFIED WHETHER PERSISTENT: ICD-10-CM

## 2023-01-24 DIAGNOSIS — J45.909 ASTHMA: ICD-10-CM

## 2023-01-25 RX ORDER — ALBUTEROL SULFATE 90 UG/1
AEROSOL, METERED RESPIRATORY (INHALATION)
Qty: 18 G | Refills: 0 | Status: SHIPPED | OUTPATIENT
Start: 2023-01-25 | End: 2023-03-16

## 2023-01-25 RX ORDER — BUDESONIDE AND FORMOTEROL FUMARATE DIHYDRATE 160; 4.5 UG/1; UG/1
AEROSOL RESPIRATORY (INHALATION)
Qty: 10.2 G | Refills: 0 | Status: SHIPPED | OUTPATIENT
Start: 2023-01-25 | End: 2023-03-16

## 2023-01-25 NOTE — TELEPHONE ENCOUNTER
"Routing refill request to provider for review/approval because:  ACT score out of date/not on file.  Patient needs to be seen because it has been more than 6 months since last office visit.    Last Written Prescription Date:  11/22/22  Last Fill Quantity: 10.2/18 g,  # refills: 0   Last office visit provider:  4/20/22     Requested Prescriptions   Pending Prescriptions Disp Refills     albuterol (PROAIR HFA/PROVENTIL HFA/VENTOLIN HFA) 108 (90 Base) MCG/ACT inhaler [Pharmacy Med Name: ALBUTEROL HFA INH(200 PUFFS)18GM] 18 g 0     Sig: INHALE 1 TO 2 PUFFS INTO THE LUNGS EVERY 6 HOURS AS NEEDED FOR SHORTNESS OF BREATH OR DIFFICULT BREATHING OR WHEEZING       Asthma Maintenance Inhalers - Anticholinergics Failed - 1/25/2023  1:23 PM        Failed - Asthma control assessment score within normal limits in last 6 months     Please review ACT score.           Failed - Recent (6 mo) or future (30 days) visit within the authorizing provider's specialty     Patient had office visit in the last 6 months or has a visit in the next 30 days with authorizing provider or within the authorizing provider's specialty.  See \"Patient Info\" tab in inbasket, or \"Choose Columns\" in Meds & Orders section of the refill encounter.            Passed - Patient is age 12 years or older        Passed - Medication is active on med list       Short-Acting Beta Agonist Inhalers Protocol  Failed - 1/25/2023  1:23 PM        Failed - Asthma control assessment score within normal limits in last 6 months     Please review ACT score.           Failed - Recent (6 mo) or future (30 days) visit within the authorizing provider's specialty     Patient had office visit in the last 6 months or has a visit in the next 30 days with authorizing provider or within the authorizing provider's specialty.  See \"Patient Info\" tab in inbasket, or \"Choose Columns\" in Meds & Orders section of the refill encounter.            Passed - Patient is age 12 or older        Passed - " "Medication is active on med list           budesonide-formoterol (SYMBICORT) 160-4.5 MCG/ACT Inhaler [Pharmacy Med Name: BUDESONIDE/FORM 160/4.5MCG(120 INH)] 10.2 g 0     Sig: INHALE 1 PUFF INTO THE LUNGS TWICE DAILY       Inhaled Steroids Protocol Failed - 1/25/2023  1:23 PM        Failed - Asthma control assessment score within normal limits in last 6 months     Please review ACT score.           Failed - Recent (6 mo) or future (30 days) visit within the authorizing provider's specialty     Patient had office visit in the last 6 months or has a visit in the next 30 days with authorizing provider or within the authorizing provider's specialty.  See \"Patient Info\" tab in inbasket, or \"Choose Columns\" in Meds & Orders section of the refill encounter.            Passed - Patient is age 12 or older        Passed - Medication is active on med list       Long-Acting Beta Agonist Inhalers Protocol  Failed - 1/25/2023  1:23 PM        Failed - Asthma control assessment score within normal limits in last 6 months     Please review ACT score.           Failed - Recent (6 mo) or future (30 days) visit within the authorizing provider's specialty     Patient had office visit in the last 6 months or has a visit in the next 30 days with authorizing provider or within the authorizing provider's specialty.  See \"Patient Info\" tab in inbasket, or \"Choose Columns\" in Meds & Orders section of the refill encounter.            Passed - Patient is age 12 or older        Passed - Order for Serevent, Striverdi, or Foradil and pt has steroid inhaler        Passed - Medication is active on med list             Darwin Dotson RN 01/25/23 1:24 PM  "

## 2023-03-16 ENCOUNTER — OFFICE VISIT (OUTPATIENT)
Dept: INTERNAL MEDICINE | Facility: CLINIC | Age: 58
End: 2023-03-16
Payer: COMMERCIAL

## 2023-03-16 ENCOUNTER — HOSPITAL ENCOUNTER (OUTPATIENT)
Dept: CT IMAGING | Facility: HOSPITAL | Age: 58
Discharge: HOME | End: 2023-03-16
Attending: INTERNAL MEDICINE | Admitting: INTERNAL MEDICINE
Payer: COMMERCIAL

## 2023-03-16 VITALS
OXYGEN SATURATION: 97 % | DIASTOLIC BLOOD PRESSURE: 92 MMHG | WEIGHT: 205 LBS | SYSTOLIC BLOOD PRESSURE: 147 MMHG | TEMPERATURE: 97.7 F | BODY MASS INDEX: 34.16 KG/M2 | HEART RATE: 93 BPM | HEIGHT: 65 IN | RESPIRATION RATE: 20 BRPM

## 2023-03-16 DIAGNOSIS — J45.901 MODERATE ASTHMA WITH EXACERBATION, UNSPECIFIED WHETHER PERSISTENT: ICD-10-CM

## 2023-03-16 DIAGNOSIS — N20.1 URETERAL STONE: ICD-10-CM

## 2023-03-16 DIAGNOSIS — J45.30 MILD PERSISTENT ASTHMA, UNSPECIFIED WHETHER COMPLICATED: ICD-10-CM

## 2023-03-16 DIAGNOSIS — N20.1 URETERAL STONE: Primary | ICD-10-CM

## 2023-03-16 PROCEDURE — 99214 OFFICE O/P EST MOD 30 MIN: CPT | Performed by: INTERNAL MEDICINE

## 2023-03-16 PROCEDURE — 74176 CT ABD & PELVIS W/O CONTRAST: CPT

## 2023-03-16 RX ORDER — TAMSULOSIN HYDROCHLORIDE 0.4 MG/1
0.4 CAPSULE ORAL DAILY
Qty: 30 CAPSULE | Refills: 0 | Status: SHIPPED | OUTPATIENT
Start: 2023-03-16 | End: 2023-09-07

## 2023-03-16 RX ORDER — BUDESONIDE AND FORMOTEROL FUMARATE DIHYDRATE 160; 4.5 UG/1; UG/1
1 AEROSOL RESPIRATORY (INHALATION) 2 TIMES DAILY
Qty: 10.2 G | Refills: 3 | Status: SHIPPED | OUTPATIENT
Start: 2023-03-16 | End: 2023-09-07

## 2023-03-16 RX ORDER — ACETAMINOPHEN 500 MG
500-1000 TABLET ORAL EVERY 6 HOURS PRN
Qty: 40 TABLET | Refills: 1 | Status: SHIPPED | OUTPATIENT
Start: 2023-03-16 | End: 2023-11-01

## 2023-03-16 RX ORDER — ALBUTEROL SULFATE 90 UG/1
1-2 AEROSOL, METERED RESPIRATORY (INHALATION) EVERY 6 HOURS PRN
Qty: 18 G | Refills: 3 | Status: SHIPPED | OUTPATIENT
Start: 2023-03-16 | End: 2023-09-07

## 2023-03-16 ASSESSMENT — PATIENT HEALTH QUESTIONNAIRE - PHQ9
10. IF YOU CHECKED OFF ANY PROBLEMS, HOW DIFFICULT HAVE THESE PROBLEMS MADE IT FOR YOU TO DO YOUR WORK, TAKE CARE OF THINGS AT HOME, OR GET ALONG WITH OTHER PEOPLE: NOT DIFFICULT AT ALL
SUM OF ALL RESPONSES TO PHQ QUESTIONS 1-9: 4
SUM OF ALL RESPONSES TO PHQ QUESTIONS 1-9: 4

## 2023-03-16 ASSESSMENT — ASTHMA QUESTIONNAIRES
QUESTION_1 LAST FOUR WEEKS HOW MUCH OF THE TIME DID YOUR ASTHMA KEEP YOU FROM GETTING AS MUCH DONE AT WORK, SCHOOL OR AT HOME: NONE OF THE TIME
QUESTION_5 LAST FOUR WEEKS HOW WOULD YOU RATE YOUR ASTHMA CONTROL: COMPLETELY CONTROLLED
ACT_TOTALSCORE: 25
ACT_TOTALSCORE: 25
QUESTION_3 LAST FOUR WEEKS HOW OFTEN DID YOUR ASTHMA SYMPTOMS (WHEEZING, COUGHING, SHORTNESS OF BREATH, CHEST TIGHTNESS OR PAIN) WAKE YOU UP AT NIGHT OR EARLIER THAN USUAL IN THE MORNING: NOT AT ALL
QUESTION_4 LAST FOUR WEEKS HOW OFTEN HAVE YOU USED YOUR RESCUE INHALER OR NEBULIZER MEDICATION (SUCH AS ALBUTEROL): NOT AT ALL
QUESTION_2 LAST FOUR WEEKS HOW OFTEN HAVE YOU HAD SHORTNESS OF BREATH: NOT AT ALL

## 2023-03-16 NOTE — PROGRESS NOTES
"  Assessment & Plan     (N20.1) Ureteral stone  (primary encounter diagnosis)  Comment: hx of, recent visit at [facility]--two weeks ago with similar sx. UA suggestive. No imaging given clinical suspicion. Put on analgesics and flomax. Was given referral to urology but he has had difficult obtaining this.  Plan: Adult Urology  Referral, CT Abdomen         Pelvis w/o Contrast, acetaminophen (TYLENOL)         500 MG tablet, tamsulosin (FLOMAX) 0.4 MG         capsule        Recommended CT scan given duration of sx. Urology consultation liikely needed  Will continue analgesic (tylenol, occasional nsaid). Still on flomax, will continue for now.    (J45.30) Mild persistent asthma, unspecified whether complicated  Comment: ongoing  Plan: budesonide-formoterol (SYMBICORT) 160-4.5         MCG/ACT Inhaler        Renewed.               BMI:   Estimated body mass index is 34.64 kg/m  as calculated from the following:    Height as of this encounter: 1.638 m (5' 4.5\").    Weight as of this encounter: 93 kg (205 lb).           No follow-ups on file.    Oleg Bañuelos MD  [facility]    Baltazar Hansen is a 57 year old accompanied by his spouse, presenting for the following health issues:  Follow Up, Recheck Medication (Pt reports that he's here for F/U from Urgent Care Visit of possible Kidney stone on 2/28/23.), and Refill Request      HPI     Pt reports that he's here for follow up from urgent care of [facility] for possible kidney Stones.  See A/P for details.  Still ongoing discomfort.        Review of Systems         Objective    BP (!) 147/92 (BP Location: Left arm, Patient Position: Sitting, Cuff Size: Adult Large)   Pulse 93   Temp 97.7  F (36.5  C) (Tympanic)   Resp 20   Ht 1.638 m (5' 4.5\")   Wt 93 kg (205 lb)   SpO2 97%   BMI 34.64 kg/m    Body mass index is 34.64 kg/m .  Physical Exam                       "

## 2023-03-17 ENCOUNTER — APPOINTMENT (OUTPATIENT)
Dept: INTERPRETER SERVICES | Facility: CLINIC | Age: 58
End: 2023-03-17
Payer: COMMERCIAL

## 2023-04-06 ENCOUNTER — VIRTUAL VISIT (OUTPATIENT)
Dept: UROLOGY | Facility: CLINIC | Age: 58
End: 2023-04-06
Attending: INTERNAL MEDICINE
Payer: COMMERCIAL

## 2023-04-06 DIAGNOSIS — N20.0 CALCULUS OF KIDNEY: ICD-10-CM

## 2023-04-06 DIAGNOSIS — N20.1 URETERAL STONE: ICD-10-CM

## 2023-04-06 DIAGNOSIS — N20.1 CALCULUS OF URETER: Primary | ICD-10-CM

## 2023-04-06 PROCEDURE — 99203 OFFICE O/P NEW LOW 30 MIN: CPT | Mod: 93 | Performed by: UROLOGY

## 2023-04-06 NOTE — PROGRESS NOTES
Patient is roomed via telephone for a telehealth visit.  Patient confirmed he is in the [facility] at the time of this appointment.  Patient understand that this visit is billable and agree to proceed with appointment.

## 2023-04-06 NOTE — PROGRESS NOTES
Assessment/Plan:    Assessment & Plan   Tyrone was seen today for new patient.    Diagnoses and all orders for this visit:    Calculus of ureter  -     Patient Stated Goal: Pass my stone  -     CT Abdomen Pelvis w/o Contrast; Future    Ureteral stone  -     Adult Urology  Referral    Calculus of kidney  -     Patient Stated Goal: Pass my stone  -     CT Abdomen Pelvis w/o Contrast; Future        Stone Management Plan      4/6/2023     8:00 AM   Stone Management   Urinary Tract Infection No suspicion of infection   Renal Colic Well controlled symptoms   Renal Failure No suspicion of renal failure   Current CT date 3/16/2023   Right sided stones? No   R Stone Event No current event   Left sided stones? Yes   L Number of ureteral stones 2   L GSD of ureteral stones 4   L Location of ureteral stone Mid   L Number of kidney stones  1   L GSD of kidney stones 4 - 10   L Hydronephrosis Mild   L Stone Event New event   Diagnosis date 3/16/2023   Initial location of primary symptomatic stone Mid   Initial GSD of primary symptomatic stone 4   L MET Status Initiation   L Current Plan MET   MET 2 week F/U           PLAN    Will proceed with medical expulsive therapy. Risks and benefits were detailed of medical expulsive therapy including probability of stone passage, recurrent renal colic, and requirement of emergency medical and/or surgical care and further imaging. Patient verbalized understanding. Patient agrees with plan as discussed. He will return in 2 weeks with low dose CT scan.    Phone call duration: 10 minutes  Patient location in [place]: Home  Distant site (provider site): Clinic  15 minutes spent by me on the date of the encounter doing chart review, history and exam, documentation and further activities per the note    JUAN PRADHAN MD  [facility]    Subjective:     HPI  Mr. Tyrone Lopez is a 57 year old H[place] male who is being evaluated via a billable telephone visit  by [facility] [place] new stone episode.    He is a remotely recurrent  stone former.     Initially presented to [facility] ED 3/1 with left flank pain but no imaging was performed apparently due to classic symptoms of renal colic. Returned to Urgent Care and had imaging 2 weeks later. Pain has been well controlled since. No fever or chills.    CT scan is personally reviewed and demonstrates a pair of left mid ureteral stones (~4 mm) and a 5 mm left lower pole stones.    Will set him up for repeat imaging to define current status. He would like to delay for a couple weeks. Will see him back at that time.    ROS   Review of systems is negative except for HPI    No past medical history on file.    Past Surgical History:   Procedure Laterality Date     APPENDECTOMY         Current Outpatient Medications   Medication Sig Dispense Refill     acetaminophen (TYLENOL) 500 MG tablet Take 1-2 tablets (500-1,000 mg) by mouth every 6 hours as needed for mild pain 40 tablet 1     albuterol (PROAIR HFA/PROVENTIL HFA/VENTOLIN HFA) 108 (90 Base) MCG/ACT inhaler Inhale 1-2 puffs into the lungs every 6 hours as needed for shortness of breath or wheezing 18 g 3     budesonide-formoterol (SYMBICORT) 160-4.5 MCG/ACT Inhaler Inhale 1 puff into the lungs 2 times daily 10.2 g 3     cetirizine (ZYRTEC) 10 MG tablet [CETIRIZINE (ZYRTEC) 10 MG TABLET] TAKE 1 TABLET BY MOUTH DAILY 90 tablet 3     montelukast (SINGULAIR) 10 MG tablet Take 1 tablet (10 mg) by mouth At Bedtime 90 tablet 3     tamsulosin (FLOMAX) 0.4 MG capsule Take 1 capsule (0.4 mg) by mouth daily 30 capsule 0       No Known Allergies    Social History     Socioeconomic History     Marital status:      Spouse name: Not on file     Number of children: Not on file     Years of education: Not on file     Highest education level: Not on file   Occupational History     Not on file   Tobacco Use     Smoking status: Never     Smokeless tobacco: Never   Vaping  Use     Vaping status: Not on file   Substance and Sexual Activity     Alcohol use: No     Drug use: No     Sexual activity: Not on file   Other Topics Concern     Not on file   Social History Narrative     Not on file     Social Determinants of Health     Financial Resource Strain: Not on file   Food Insecurity: Not on file   Transportation Needs: Not on file   Physical Activity: Not on file   Stress: Not on file   Social Connections: Not on file   Intimate Partner Violence: Not on file   Housing Stability: Not on file       No family history on file.    Objective:     No vitals or physical exam obtained due to virtual visit    Labs  Most Recent 3 CBC's:No lab results found.  Most Recent 3 BMP's:Recent Labs   Lab Test 01/06/22  1056      POTASSIUM 4.5   CHLORIDE 106   CO2 25   BUN 19   CR 0.99   ANIONGAP 11   GASTON 9.5        Most Recent Urinalysis:No lab results found.  Acute Labs Urine Culture  No results found for: CULTURE

## 2023-04-20 ENCOUNTER — HOSPITAL ENCOUNTER (OUTPATIENT)
Dept: CT IMAGING | Facility: HOSPITAL | Age: 58
Discharge: HOME | End: 2023-04-20
Attending: UROLOGY | Admitting: UROLOGY
Payer: COMMERCIAL

## 2023-04-20 DIAGNOSIS — N20.0 CALCULUS OF KIDNEY: ICD-10-CM

## 2023-04-20 DIAGNOSIS — N20.1 CALCULUS OF URETER: ICD-10-CM

## 2023-04-20 PROCEDURE — 74176 CT ABD & PELVIS W/O CONTRAST: CPT

## 2023-04-20 PROCEDURE — 82365 CALCULUS SPECTROSCOPY: CPT

## 2023-04-24 ENCOUNTER — VIRTUAL VISIT (OUTPATIENT)
Dept: UROLOGY | Facility: CLINIC | Age: 58
End: 2023-04-24
Payer: COMMERCIAL

## 2023-04-24 DIAGNOSIS — N20.0 CALCULUS OF KIDNEY: ICD-10-CM

## 2023-04-24 DIAGNOSIS — N20.1 CALCULUS OF URETER: Primary | ICD-10-CM

## 2023-04-24 PROCEDURE — 99213 OFFICE O/P EST LOW 20 MIN: CPT | Mod: TEL | Performed by: UROLOGY

## 2023-04-24 NOTE — PROGRESS NOTES
Assessment/Plan:    Assessment & Plan   Tyrone was seen today for follow up.    Diagnoses and all orders for this visit:    Calculus of ureter  -     Stone analysis; Future    Calculus of kidney        Stone Management Plan      4/6/2023     8:00 AM 4/24/2023     9:00 AM   Stone Management   Urinary Tract Infection No suspicion of infection No suspicion of infection   Renal Colic Well controlled symptoms Asymptomatic at this time   Renal Failure No suspicion of renal failure No suspicion of renal failure   Current CT date 3/16/2023 4/20/2023   Right sided stones? No No   R Stone Event No current event No current event   Left sided stones? Yes Yes   L Number of ureteral stones 2 No ureteral stones   L GSD of ureteral stones 4    L Location of ureteral stone Mid    L Number of kidney stones  1 1   L GSD of kidney stones 4 - 10 2 - 4   L Hydronephrosis Mild None   L Stone Event New event Resolved event   Diagnosis date 3/16/2023    Initial location of primary symptomatic stone Mid    Initial GSD of primary symptomatic stone 4    Resolved date  4/20/2023   L MET Status Initiation Passed   L Current Plan MET    MET 2 week F/U            PLAN      Phone call duration: 7 minutes  Patient location in [place]: Home  Distant site (provider site): Remote  15 minutes spent by me on the date of the encounter doing chart review, history and exam, documentation and further activities per the note    JUAN PRADHAN MD  [facility]    Subjective:     HPI  Mr. Tyrone Lopez is a 57 year old Hmong male who is being evaluated via a billable telephone visit by [facility] [place] for medical expulsive therapy follow up.     On last encounter, his 4 mm stones were in left distal ureter with Mild hydronephrosis. He has had no unanticipated events.    Symptoms have been minimal . He is asymptomatic at present. He denies symptoms of fever, chills, flank pain, nausea, vomiting, urinary  frequency and dysuria.     New CT scan was personally reviewed and demonstrates passage of stone with no hydronephrosis.     He is congratulated on passing his stones. HE caught at least one stone and we will make arrangements for analysis.         ROS   Review of systems is negative except for HPI.    No past medical history on file.    Past Surgical History:   Procedure Laterality Date     APPENDECTOMY         Current Outpatient Medications   Medication Sig Dispense Refill     acetaminophen (TYLENOL) 500 MG tablet Take 1-2 tablets (500-1,000 mg) by mouth every 6 hours as needed for mild pain 40 tablet 1     albuterol (PROAIR HFA/PROVENTIL HFA/VENTOLIN HFA) 108 (90 Base) MCG/ACT inhaler Inhale 1-2 puffs into the lungs every 6 hours as needed for shortness of breath or wheezing 18 g 3     budesonide-formoterol (SYMBICORT) 160-4.5 MCG/ACT Inhaler Inhale 1 puff into the lungs 2 times daily 10.2 g 3     cetirizine (ZYRTEC) 10 MG tablet [CETIRIZINE (ZYRTEC) 10 MG TABLET] TAKE 1 TABLET BY MOUTH DAILY 90 tablet 3     montelukast (SINGULAIR) 10 MG tablet Take 1 tablet (10 mg) by mouth At Bedtime 90 tablet 3     tamsulosin (FLOMAX) 0.4 MG capsule Take 1 capsule (0.4 mg) by mouth daily 30 capsule 0       No Known Allergies    Social History     Socioeconomic History     Marital status:      Spouse name: Not on file     Number of children: Not on file     Years of education: Not on file     Highest education level: Not on file   Occupational History     Not on file   Tobacco Use     Smoking status: Never     Smokeless tobacco: Never   Vaping Use     Vaping status: Not on file   Substance and Sexual Activity     Alcohol use: No     Drug use: No     Sexual activity: Not on file   Other Topics Concern     Not on file   Social History Narrative     Not on file     Social Determinants of Health     Financial Resource Strain: Not on file   Food Insecurity: Not on file   Transportation Needs: Not on file   Physical Activity:  Not on file   Stress: Not on file   Social Connections: Not on file   Intimate Partner Violence: Not on file   Housing Stability: Not on file       No family history on file.    Objective:     No vitals or physical exam obtained due to virtual visit  Labs     Most Recent 3 CBC's:No lab results found.  Most Recent 3 BMP's:Recent Labs   Lab Test 01/06/22  1056      POTASSIUM 4.5   CHLORIDE 106   CO2 25   BUN 19   CR 0.99   ANIONGAP 11   GASTON 9.5        Most Recent Urinalysis:No lab results found.  Acute Labs Urine Culture  No results found for: CULTURE

## 2023-04-24 NOTE — PROGRESS NOTES
Patient is roomed via telephone for a telehealth visit.  Patient confirmed he is in the [facility] at the time of this appointment.  Patient understand that this visit is billable and agree to proceed with appointment.

## 2023-04-28 ENCOUNTER — LAB (OUTPATIENT)
Dept: LAB | Facility: HOSPITAL | Age: 58
End: 2023-04-28
Payer: COMMERCIAL

## 2023-04-28 DIAGNOSIS — N20.1 CALCULUS OF URETER: ICD-10-CM

## 2023-05-02 LAB
APPEARANCE STONE: NORMAL
COMPN STONE: NORMAL
SPECIMEN WT: 90 MG

## 2023-09-07 ENCOUNTER — OFFICE VISIT (OUTPATIENT)
Dept: INTERNAL MEDICINE | Facility: CLINIC | Age: 58
End: 2023-09-07
Payer: COMMERCIAL

## 2023-09-07 ENCOUNTER — LAB (OUTPATIENT)
Dept: INTERNAL MEDICINE | Facility: CLINIC | Age: 58
End: 2023-09-07

## 2023-09-07 VITALS
HEART RATE: 89 BPM | RESPIRATION RATE: 19 BRPM | SYSTOLIC BLOOD PRESSURE: 143 MMHG | BODY MASS INDEX: 36.93 KG/M2 | HEIGHT: 63 IN | OXYGEN SATURATION: 98 % | WEIGHT: 208.4 LBS | TEMPERATURE: 97.4 F | DIASTOLIC BLOOD PRESSURE: 96 MMHG

## 2023-09-07 DIAGNOSIS — J45.30 MILD PERSISTENT ASTHMA, UNSPECIFIED WHETHER COMPLICATED: ICD-10-CM

## 2023-09-07 DIAGNOSIS — Z11.59 NEED FOR HEPATITIS C SCREENING TEST: ICD-10-CM

## 2023-09-07 DIAGNOSIS — Z11.4 SCREENING FOR HIV (HUMAN IMMUNODEFICIENCY VIRUS): ICD-10-CM

## 2023-09-07 DIAGNOSIS — E66.01 CLASS 2 SEVERE OBESITY WITH SERIOUS COMORBIDITY IN ADULT, UNSPECIFIED BMI, UNSPECIFIED OBESITY TYPE (H): ICD-10-CM

## 2023-09-07 DIAGNOSIS — E66.812 CLASS 2 SEVERE OBESITY WITH SERIOUS COMORBIDITY IN ADULT, UNSPECIFIED BMI, UNSPECIFIED OBESITY TYPE (H): ICD-10-CM

## 2023-09-07 DIAGNOSIS — E78.2 MIXED HYPERLIPIDEMIA: ICD-10-CM

## 2023-09-07 DIAGNOSIS — Z00.00 PHYSICAL EXAM, ANNUAL: Primary | ICD-10-CM

## 2023-09-07 DIAGNOSIS — Z12.11 SCREEN FOR COLON CANCER: ICD-10-CM

## 2023-09-07 DIAGNOSIS — Z12.5 SCREENING FOR PROSTATE CANCER: ICD-10-CM

## 2023-09-07 PROBLEM — J45.20 MILD INTERMITTENT ASTHMA WITHOUT COMPLICATION: Status: ACTIVE | Noted: 2023-09-07

## 2023-09-07 LAB
ALBUMIN SERPL BCG-MCNC: 4.6 G/DL (ref 3.5–5.2)
ALP SERPL-CCNC: 78 U/L (ref 40–129)
ALT SERPL W P-5'-P-CCNC: 42 U/L (ref 0–70)
ANION GAP SERPL CALCULATED.3IONS-SCNC: 14 MMOL/L (ref 7–15)
AST SERPL W P-5'-P-CCNC: 29 U/L (ref 0–45)
BILIRUB SERPL-MCNC: 0.4 MG/DL
BUN SERPL-MCNC: 28.6 MG/DL (ref 8–23)
CALCIUM SERPL-MCNC: 9.3 MG/DL (ref 8.8–10.2)
CHLORIDE SERPL-SCNC: 106 MMOL/L (ref 98–107)
CHOLEST SERPL-MCNC: 261 MG/DL
CREAT SERPL-MCNC: 1.17 MG/DL (ref 0.67–1.17)
DEPRECATED HCO3 PLAS-SCNC: 20 MMOL/L (ref 22–29)
EGFRCR SERPLBLD CKD-EPI 2021: 71 ML/MIN/1.73M2
GLUCOSE SERPL-MCNC: 116 MG/DL (ref 70–99)
HDLC SERPL-MCNC: 54 MG/DL
LDLC SERPL CALC-MCNC: 181 MG/DL
NONHDLC SERPL-MCNC: 207 MG/DL
POTASSIUM SERPL-SCNC: 4.4 MMOL/L (ref 3.4–5.3)
PROT SERPL-MCNC: 7.8 G/DL (ref 6.4–8.3)
PSA SERPL DL<=0.01 NG/ML-MCNC: 0.47 NG/ML (ref 0–4.5)
SODIUM SERPL-SCNC: 140 MMOL/L (ref 136–145)
TRIGL SERPL-MCNC: 130 MG/DL

## 2023-09-07 PROCEDURE — 90715 TDAP VACCINE 7 YRS/> IM: CPT | Performed by: INTERNAL MEDICINE

## 2023-09-07 PROCEDURE — 99213 OFFICE O/P EST LOW 20 MIN: CPT | Mod: 25 | Performed by: INTERNAL MEDICINE

## 2023-09-07 PROCEDURE — 90471 IMMUNIZATION ADMIN: CPT | Performed by: INTERNAL MEDICINE

## 2023-09-07 PROCEDURE — 87389 HIV-1 AG W/HIV-1&-2 AB AG IA: CPT | Performed by: INTERNAL MEDICINE

## 2023-09-07 PROCEDURE — 36415 COLL VENOUS BLD VENIPUNCTURE: CPT | Performed by: INTERNAL MEDICINE

## 2023-09-07 PROCEDURE — 80061 LIPID PANEL: CPT | Performed by: INTERNAL MEDICINE

## 2023-09-07 PROCEDURE — 99396 PREV VISIT EST AGE 40-64: CPT | Mod: 25 | Performed by: INTERNAL MEDICINE

## 2023-09-07 PROCEDURE — G0103 PSA SCREENING: HCPCS | Performed by: INTERNAL MEDICINE

## 2023-09-07 PROCEDURE — 90472 IMMUNIZATION ADMIN EACH ADD: CPT | Performed by: INTERNAL MEDICINE

## 2023-09-07 PROCEDURE — 86803 HEPATITIS C AB TEST: CPT | Performed by: INTERNAL MEDICINE

## 2023-09-07 PROCEDURE — 90750 HZV VACC RECOMBINANT IM: CPT | Performed by: INTERNAL MEDICINE

## 2023-09-07 PROCEDURE — 80053 COMPREHEN METABOLIC PANEL: CPT | Performed by: INTERNAL MEDICINE

## 2023-09-07 RX ORDER — ALBUTEROL SULFATE 90 UG/1
1-2 AEROSOL, METERED RESPIRATORY (INHALATION) EVERY 6 HOURS PRN
Qty: 18 G | Refills: 3 | Status: SHIPPED | OUTPATIENT
Start: 2023-09-07

## 2023-09-07 RX ORDER — BUDESONIDE AND FORMOTEROL FUMARATE DIHYDRATE 160; 4.5 UG/1; UG/1
1 AEROSOL RESPIRATORY (INHALATION) 2 TIMES DAILY
Qty: 10.2 G | Refills: 3 | Status: SHIPPED | OUTPATIENT
Start: 2023-09-07 | End: 2024-01-09

## 2023-09-07 ASSESSMENT — ENCOUNTER SYMPTOMS
FEVER: 0
ARTHRALGIAS: 1
PARESTHESIAS: 1
NAUSEA: 0
EYE PAIN: 0
PALPITATIONS: 0
CONSTIPATION: 0
CHILLS: 0
HEMATOCHEZIA: 0
SHORTNESS OF BREATH: 0
ABDOMINAL PAIN: 0
FREQUENCY: 0
DYSURIA: 0
COUGH: 0
SORE THROAT: 0
HEADACHES: 1
DIZZINESS: 0
NERVOUS/ANXIOUS: 0
JOINT SWELLING: 1
DIARRHEA: 0
HEARTBURN: 0
MYALGIAS: 0
WEAKNESS: 1
HEMATURIA: 0

## 2023-09-07 ASSESSMENT — ASTHMA QUESTIONNAIRES: ACT_TOTALSCORE: 25

## 2023-09-07 NOTE — PROGRESS NOTES
SUBJECTIVE:   CC: Tyrone is an 58 year old who presents for preventative health visit.       9/7/2023     9:13 AM   Additional Questions   Roomed by wander   Accompanied by alone         9/7/2023     9:13 AM   Patient Reported Additional Medications   Patient reports taking the following new medications none       Healthy Habits:     Getting at least 3 servings of Calcium per day:  Yes    Bi-annual eye exam:  NO    Dental care twice a year:  Yes    Sleep apnea or symptoms of sleep apnea:  None    Diet:  Regular (no restrictions)    Frequency of exercise:  2-3 days/week    Duration of exercise:  15-30 minutes    Taking medications regularly:  Yes    Medication side effects:  None    Additional concerns today:  Yes                  Have you ever done Advance Care Planning? (For example, a Health Directive, POLST, or a discussion with a medical provider or your loved ones about your wishes): No, advance care planning information given to patient to review.  Patient declined advance care planning discussion at this time.    Social History     Tobacco Use    Smoking status: Never    Smokeless tobacco: Never   Substance Use Topics    Alcohol use: No             9/7/2023     9:26 AM   Alcohol Use   Prescreen: >3 drinks/day or >7 drinks/week? No       Last PSA:   Prostate Specific Antigen Screen   Date Value Ref Range Status   12/30/2013 0.64 <4.01 ng/mL Final       Reviewed orders with patient. Reviewed health maintenance and updated orders accordingly - Yes      Reviewed and updated as needed this visit by clinical staff   Tobacco   Meds              Reviewed and updated as needed this visit by Provider                     Review of Systems   Constitutional:  Negative for chills and fever.   HENT:  Negative for congestion, ear pain, hearing loss and sore throat.    Eyes:  Negative for pain and visual disturbance.   Respiratory:  Negative for cough and shortness of breath.    Cardiovascular:  Negative for chest pain,  "palpitations and peripheral edema.   Gastrointestinal:  Negative for abdominal pain, constipation, diarrhea, heartburn, hematochezia and nausea.   Genitourinary:  Negative for dysuria, frequency, genital sores, hematuria, impotence, penile discharge and urgency.   Musculoskeletal:  Positive for arthralgias and joint swelling. Negative for myalgias.   Skin:  Negative for rash.   Neurological:  Positive for weakness, headaches and paresthesias. Negative for dizziness.   Psychiatric/Behavioral:  Negative for mood changes. The patient is not nervous/anxious.          OBJECTIVE:   BP (!) 142/86 (BP Location: Left arm, Patient Position: Sitting, Cuff Size: Adult Large)   Pulse 89   Temp 97.4  F (36.3  C) (Tympanic)   Resp 19   Ht 1.6 m (5' 3\")   Wt 94.5 kg (208 lb 6.4 oz)   SpO2 98%   BMI 36.92 kg/m      Physical Exam  GENERAL: healthy, alert and no distress  EYES: Eyes grossly normal to inspection, PERRL and conjunctivae and sclerae normal  HENT: ear canals and TM's normal, nose and mouth without ulcers or lesions  NECK: no adenopathy, no asymmetry, masses, or scars and thyroid normal to palpation  RESP: lungs clear to auscultation - no rales, rhonchi or wheezes  CV: regular rate and rhythm, normal S1 S2, no S3 or S4, no murmur, click or rub, no peripheral edema and peripheral pulses strong  ABDOMEN: soft, nontender, no hepatosplenomegaly, no masses and bowel sounds normal  MS: no gross musculoskeletal defects noted, no edema  SKIN: no suspicious lesions or rashes  NEURO: Normal strength and tone, mentation intact and speech normal  PSYCH: mentation appears normal, affect normal/bright    Diagnostic Test Results:  Labs reviewed in Epic    ASSESSMENT/PLAN:   (Z00.00) Physical exam, annual  (primary encounter diagnosis)  Comment: normal   Plan: HM items reviewed.    (E78.2) Mixed hyperlipidemia  Comment: hx of, not on medication at this time  Plan: Lipid panel reflex to direct LDL Fasting,         Comprehensive " "metabolic panel        recheck    (E66.01) Class 2 severe obesity with serious comorbidity in adult, unspecified BMI, unspecified obesity type (H)  Comment: associated with hyperlipidemia, see above  Plan: discussed lifestyle modifications, continued efforts to lose weight.    (J45.30) Mild persistent asthma, unspecified whether complicated  Comment: stable overall on inhalers  Plan: budesonide-formoterol (SYMBICORT) 160-4.5         MCG/ACT Inhaler        Will plan to continue on previous medication without changes     (Z12.11) Screen for colon cancer  Comment: due  Plan: COLOGUARD([employer])        Discussed options, cologuard ordered per patient preference.    (Z12.5) Screening for prostate cancer  Comment: ongoing screening desired  Plan: PSA, screen        Ordered    See me again in a year          COUNSELING:   Reviewed preventive health counseling, as reflected in patient instructions      BMI:   Estimated body mass index is 36.92 kg/m  as calculated from the following:    Height as of this encounter: 1.6 m (5' 3\").    Weight as of this encounter: 94.5 kg (208 lb 6.4 oz).   Weight management plan: Discussed healthy diet and exercise guidelines      He reports that he has never smoked. He has never used smokeless tobacco.            Oleg Bañuelos MD  [facility]  "

## 2023-09-07 NOTE — LETTER
September 8, 2023      Tyrone Lopez  [address] [place] [address]        Dear ,    We are writing to inform you of your test results.    Tyrone,   It was good to see you again   Your sugar and cholesterol levels are high. You will need to exercise more and eat a better diet.   We will talk more about these results when I see you again in March   Please contact me if you have any questions related to these results     Resulted Orders   HIV Antigen Antibody Combo   Result Value Ref Range    HIV Antigen Antibody Combo Nonreactive Nonreactive      Comment:      HIV-1 p24 Ag & HIV-1/HIV-2 Ab Not Detected   Hepatitis C Screen Reflex to HCV RNA Quant and Genotype   Result Value Ref Range    Hepatitis C Antibody Nonreactive Nonreactive    Narrative    Assay performance characteristics have not been established for newborns, infants, and children.   Lipid panel reflex to direct LDL Fasting   Result Value Ref Range    Cholesterol 261 (H) <200 mg/dL    Triglycerides 130 <150 mg/dL    Direct Measure HDL 54 >=40 mg/dL    LDL Cholesterol Calculated 181 (H) <=100 mg/dL    Non HDL Cholesterol 207 (H) <130 mg/dL    Narrative    Cholesterol  Desirable:  <200 mg/dL    Triglycerides  Normal:  Less than 150 mg/dL  Borderline High:  150-199 mg/dL  High:  200-499 mg/dL  Very High:  Greater than or equal to 500 mg/dL    Direct Measure HDL  Female:  Greater than or equal to 50 mg/dL   Male:  Greater than or equal to 40 mg/dL    LDL Cholesterol  Desirable:  <100mg/dL  Above Desirable:  100-129 mg/dL   Borderline High:  130-159 mg/dL   High:  160-189 mg/dL   Very High:  >= 190 mg/dL    Non HDL Cholesterol  Desirable:  130 mg/dL  Above Desirable:  130-159 mg/dL  Borderline High:  160-189 mg/dL  High:  190-219 mg/dL  Very High:  Greater than or equal to 220 mg/dL   Comprehensive metabolic panel   Result Value Ref Range    Sodium 140 136 - 145 mmol/L    Potassium 4.4 3.4 - 5.3 mmol/L    Chloride 106 98 - 107 mmol/L    Carbon Dioxide (CO2)  20 (L) 22 - 29 mmol/L    Anion Gap 14 7 - 15 mmol/L    Urea Nitrogen 28.6 (H) 8.0 - 23.0 mg/dL    Creatinine 1.17 0.67 - 1.17 mg/dL    Calcium 9.3 8.8 - 10.2 mg/dL    Glucose 116 (H) 70 - 99 mg/dL    Alkaline Phosphatase 78 40 - 129 U/L    AST 29 0 - 45 U/L      Comment:      Reference intervals for this test were updated on 6/12/2023 to more accurately reflect our healthy population. There may be differences in the flagging of prior results with similar values performed with this method. Interpretation of those prior results can be made in the context of the updated reference intervals.    ALT 42 0 - 70 U/L      Comment:      Reference intervals for this test were updated on 6/12/2023 to more accurately reflect our healthy population. There may be differences in the flagging of prior results with similar values performed with this method. Interpretation of those prior results can be made in the context of the updated reference intervals.      Protein Total 7.8 6.4 - 8.3 g/dL    Albumin 4.6 3.5 - 5.2 g/dL    Bilirubin Total 0.4 <=1.2 mg/dL    GFR Estimate 71 >60 mL/min/1.73m2   PSA, screen   Result Value Ref Range    Prostate Specific Antigen Screen 0.47 0.00 - 4.50 ng/mL    Narrative    This result is obtained using the Roche Elecsys total PSA method on the bentley [id number] immunoassay analyzer. Results obtained with different assay methods or kits cannot be used interchangeably.       If you have any questions or concerns, please call the clinic at the number listed above.       Sincerely,      Oleg Bañuelos MD

## 2023-09-08 PROBLEM — J45.30 MILD PERSISTENT ASTHMA, UNSPECIFIED WHETHER COMPLICATED: Status: ACTIVE | Noted: 2023-09-08

## 2023-09-08 LAB
HCV AB SERPL QL IA: NONREACTIVE
HIV 1+2 AB+HIV1 P24 AG SERPL QL IA: NONREACTIVE

## 2023-09-22 LAB — NONINV COLON CA DNA+OCC BLD SCRN STL QL: NEGATIVE

## 2023-10-02 DIAGNOSIS — J45.30 MILD PERSISTENT ASTHMA, UNSPECIFIED WHETHER COMPLICATED: ICD-10-CM

## 2023-10-03 RX ORDER — BUDESONIDE AND FORMOTEROL FUMARATE DIHYDRATE 160; 4.5 UG/1; UG/1
1 AEROSOL RESPIRATORY (INHALATION) 2 TIMES DAILY
Qty: 10.2 G | Refills: 3 | OUTPATIENT
Start: 2023-10-03

## 2023-10-09 ENCOUNTER — IMMUNIZATION (OUTPATIENT)
Dept: FAMILY MEDICINE | Facility: CLINIC | Age: 58
End: 2023-10-09
Payer: COMMERCIAL

## 2023-10-09 ENCOUNTER — TELEPHONE (OUTPATIENT)
Dept: INTERNAL MEDICINE | Facility: CLINIC | Age: 58
End: 2023-10-09

## 2023-10-09 DIAGNOSIS — J45.30 MILD PERSISTENT ASTHMA, UNSPECIFIED WHETHER COMPLICATED: Primary | ICD-10-CM

## 2023-10-09 PROCEDURE — 90682 RIV4 VACC RECOMBINANT DNA IM: CPT

## 2023-10-09 PROCEDURE — 90471 IMMUNIZATION ADMIN: CPT

## 2023-10-09 NOTE — TELEPHONE ENCOUNTER
Patient arrives in the clinic today for his annual flu shot however is requesting an Rx for Prednisone. Patient has asthma and is currently using his Symbicort and albuterol inhaler however has notes some persistent dry cough along with mild wheezing especially at bedtime. Patient indicates that his sxs's started about 1.5 wk ago after the weather has become cooler. Patient reports no other sxs's (no fever, no sore throat, no body aches, etc, tested negative for Covid at home)    Rx for prednisone pended if agreeable for refill. If patient requires a visit please route message to  team for arranging, thank you.

## 2023-10-10 RX ORDER — PREDNISONE 20 MG/1
40 TABLET ORAL DAILY
Qty: 10 TABLET | Refills: 0 | Status: SHIPPED | OUTPATIENT
Start: 2023-10-10 | End: 2023-11-07

## 2023-10-26 ENCOUNTER — HOSPITAL ENCOUNTER (OUTPATIENT)
Dept: CT IMAGING | Facility: HOSPITAL | Age: 58
Discharge: HOME | End: 2023-10-26
Attending: INTERNAL MEDICINE | Admitting: INTERNAL MEDICINE
Payer: COMMERCIAL

## 2023-10-26 ENCOUNTER — NURSE TRIAGE (OUTPATIENT)
Dept: NURSING | Facility: CLINIC | Age: 58
End: 2023-10-26
Payer: COMMERCIAL

## 2023-10-26 ENCOUNTER — OFFICE VISIT (OUTPATIENT)
Dept: INTERNAL MEDICINE | Facility: CLINIC | Age: 58
End: 2023-10-26
Payer: COMMERCIAL

## 2023-10-26 VITALS
HEIGHT: 63 IN | SYSTOLIC BLOOD PRESSURE: 173 MMHG | RESPIRATION RATE: 16 BRPM | WEIGHT: 202.3 LBS | HEART RATE: 106 BPM | BODY MASS INDEX: 35.84 KG/M2 | OXYGEN SATURATION: 98 % | TEMPERATURE: 97.9 F | DIASTOLIC BLOOD PRESSURE: 116 MMHG

## 2023-10-26 DIAGNOSIS — R10.9 FLANK PAIN: ICD-10-CM

## 2023-10-26 DIAGNOSIS — I10 ESSENTIAL HYPERTENSION: ICD-10-CM

## 2023-10-26 DIAGNOSIS — R31.0 GROSS HEMATURIA: Primary | ICD-10-CM

## 2023-10-26 DIAGNOSIS — R31.0 GROSS HEMATURIA: ICD-10-CM

## 2023-10-26 LAB
ALBUMIN UR-MCNC: 100 MG/DL
APPEARANCE UR: ABNORMAL
BACTERIA #/AREA URNS HPF: ABNORMAL /HPF
BILIRUB UR QL STRIP: NEGATIVE
COLOR UR AUTO: ABNORMAL
ERYTHROCYTE [DISTWIDTH] IN BLOOD BY AUTOMATED COUNT: 12.8 % (ref 10–15)
GLUCOSE UR STRIP-MCNC: NEGATIVE MG/DL
HCT VFR BLD AUTO: 45.3 % (ref 40–53)
HGB BLD-MCNC: 15.5 G/DL (ref 13.3–17.7)
HGB UR QL STRIP: ABNORMAL
KETONES UR STRIP-MCNC: NEGATIVE MG/DL
LEUKOCYTE ESTERASE UR QL STRIP: NEGATIVE
MCH RBC QN AUTO: 29.5 PG (ref 26.5–33)
MCHC RBC AUTO-ENTMCNC: 34.2 G/DL (ref 31.5–36.5)
MCV RBC AUTO: 86 FL (ref 78–100)
MUCOUS THREADS #/AREA URNS LPF: PRESENT /LPF
NITRATE UR QL: NEGATIVE
PH UR STRIP: 5.5 [PH] (ref 5–8)
PLATELET # BLD AUTO: 235 10E3/UL (ref 150–450)
RBC # BLD AUTO: 5.25 10E6/UL (ref 4.4–5.9)
RBC #/AREA URNS AUTO: ABNORMAL /HPF
SP GR UR STRIP: 1.02 (ref 1–1.03)
SQUAMOUS #/AREA URNS AUTO: ABNORMAL /LPF
URATE CRY #/AREA URNS HPF: ABNORMAL /HPF
UROBILINOGEN UR STRIP-ACNC: 0.2 E.U./DL
WBC # BLD AUTO: 14.1 10E3/UL (ref 4–11)
WBC #/AREA URNS AUTO: ABNORMAL /HPF

## 2023-10-26 PROCEDURE — 84443 ASSAY THYROID STIM HORMONE: CPT | Performed by: INTERNAL MEDICINE

## 2023-10-26 PROCEDURE — 85027 COMPLETE CBC AUTOMATED: CPT | Performed by: INTERNAL MEDICINE

## 2023-10-26 PROCEDURE — 90480 ADMN SARSCOV2 VAC 1/ONLY CMP: CPT | Performed by: INTERNAL MEDICINE

## 2023-10-26 PROCEDURE — 91320 SARSCV2 VAC 30MCG TRS-SUC IM: CPT | Performed by: INTERNAL MEDICINE

## 2023-10-26 PROCEDURE — 99214 OFFICE O/P EST MOD 30 MIN: CPT | Performed by: INTERNAL MEDICINE

## 2023-10-26 PROCEDURE — 81001 URINALYSIS AUTO W/SCOPE: CPT | Performed by: INTERNAL MEDICINE

## 2023-10-26 PROCEDURE — 84550 ASSAY OF BLOOD/URIC ACID: CPT | Performed by: INTERNAL MEDICINE

## 2023-10-26 PROCEDURE — 74176 CT ABD & PELVIS W/O CONTRAST: CPT

## 2023-10-26 PROCEDURE — 80048 BASIC METABOLIC PNL TOTAL CA: CPT | Performed by: INTERNAL MEDICINE

## 2023-10-26 PROCEDURE — 36415 COLL VENOUS BLD VENIPUNCTURE: CPT | Performed by: INTERNAL MEDICINE

## 2023-10-26 RX ORDER — TAMSULOSIN HYDROCHLORIDE 0.4 MG/1
0.4 CAPSULE ORAL DAILY
Qty: 34 CAPSULE | Refills: 3 | Status: SHIPPED | OUTPATIENT
Start: 2023-10-26 | End: 2024-03-07

## 2023-10-26 RX ORDER — ATENOLOL AND CHLORTHALIDONE TABLET 50; 25 MG/1; MG/1
1 TABLET ORAL DAILY
Qty: 90 TABLET | Refills: 3 | Status: SHIPPED | OUTPATIENT
Start: 2023-10-26 | End: 2024-03-07

## 2023-10-26 ASSESSMENT — PAIN SCALES - GENERAL: PAINLEVEL: SEVERE PAIN (6)

## 2023-10-26 NOTE — PATIENT INSTRUCTIONS
"Limit oxalate intake -- For all patients with calcium oxalate stones, we suggest limiting intake of high oxalate foods and supplemental vitamin C. However, excessive restriction of oxalate is not likely to be helpful; patients should continue to consume a wide variety of fruits and vegetables while avoiding those very high in oxalate. (See \"Kidney stones in adults: Epidemiology and risk factors\", section on 'High urine oxalate' and \"Kidney stones in adults: Epidemiology and risk factors\", section on 'Oxalate'.)  Some foods contain very large amounts of oxalate, and those should be avoided (eg, spinach, rhubarb, potatoes). In addition, some nuts and legumes are also high in oxalate, and the intake should be limited (eg, peanuts, cashews, almonds). High-oxalate foods should be avoided regardless of urine oxalate since there may be a period of very high urinary oxalate excretion soon after the food is consumed. If a low-oxalate diet is recommended, it should only be continued if there is documented evidence that the urine oxalate excretion has fallen. The oxalate content of foods is available at the following website  "

## 2023-10-26 NOTE — TELEPHONE ENCOUNTER
Tyrone gave consent to speak with aKren daughter in law.  Tyrone when used bathroom today urination with blood.  Patient is having lower back pain.  Denies fever.  FNA advised urgent care is also open and Karen agrees.        Reason for Disposition   Side (flank) or back pain present    Additional Information   Negative: Shock suspected (e.g., cold/pale/clammy skin, too weak to stand, low BP, rapid pulse)   Negative: Sounds like a life-threatening emergency to the triager   Negative: Recent back or abdominal injury   Negative: Recent genital injury   Negative: [1] Unable to urinate (or only a few drops) > 4 hours AND [2] bladder feels very full (e.g., palpable bladder or strong urge to urinate)   Negative: [1] Diffuse (all over) muscle pains in the shoulders, arms, legs, and back AND [2] dark (cola or tea-colored) or red-colored urine   Negative: Passing pure blood or large blood clots (i.e., size > a dime)  (Exception: Oswaldo or small strands.)   Negative: Fever > 100.4 F (38.0 C)   Negative: Patient sounds very sick or weak to the triager   Negative: [1] Pain or burning with passing urine AND [2] side (flank) or back pain present   Negative: Known sickle cell disease   Negative: Taking Coumadin (warfarin) or other strong blood thinner, or known bleeding disorder (e.g., thrombocytopenia)   Negative: Pain or burning with passing urine    Protocols used: [facility]

## 2023-10-26 NOTE — PROGRESS NOTES
Assessment & Plan     Gross hematuria  No clinical evidence of infection but will check culture most likely this is a recurrence of his nephrolithiasis.  We will repeat CT scan because of the severity of the hematuria and to make sure there is no significant hydronephrosis check creatinine.  Reviewed the history as well with him in detail he is seen the kidney stone clinic.  Stone analysis was done with the last stone that he passed and it was clearly a calcium oxalate stone.  Did discuss this with him he should get a nutritional referral given his high cholesterol and hypertension and to reduce oxalate and processed carbs and red meat in his diet.  He can take tamsulosin for and drink plenty of water unless the stone is bigger than 7 mm he should be able to pass it again spontaneously- UA Macroscopic with reflex to Microscopic and Culture - Lab Collect; Future  - CT Abdomen Pelvis w/o Contrast; Future  - Basic metabolic panel  (Ca, Cl, CO2, Creat, Gluc, K, Na, BUN); Future  - CBC with platelets; Future  - tamsulosin (FLOMAX) 0.4 MG capsule; Take 1 capsule (0.4 mg) by mouth daily  - Stone analysis; Future  - Nutrition Referral; Future  - TSH; Future  - atenolol-chlorthalidone (TENORETIC) 50-25 MG tablet; Take 1 tablet by mouth daily  - UA Macroscopic with reflex to Microscopic and Culture - Lab Collect  - Basic metabolic panel  (Ca, Cl, CO2, Creat, Gluc, K, Na, BUN)  - CBC with platelets  - Stone analysis  - TSH  - UA Microscopic with Reflex to Culture    Flank pain  We will not check his PSA was just done and it was normal  - UA Macroscopic with reflex to Microscopic and Culture - Lab Collect; Future  - CT Abdomen Pelvis w/o Contrast; Future  - Basic metabolic panel  (Ca, Cl, CO2, Creat, Gluc, K, Na, BUN); Future  - CBC with platelets; Future  - tamsulosin (FLOMAX) 0.4 MG capsule; Take 1 capsule (0.4 mg) by mouth daily  - Stone analysis; Future  - Nutrition Referral; Future  - TSH; Future  -  atenolol-chlorthalidone (TENORETIC) 50-25 MG tablet; Take 1 tablet by mouth daily  - UA Macroscopic with reflex to Microscopic and Culture - Lab Collect  - Basic metabolic panel  (Ca, Cl, CO2, Creat, Gluc, K, Na, BUN)  - CBC with platelets  - Stone analysis  - TSH  - UA Microscopic with Reflex to Culture    - UA Macroscopic with reflex to Microscopic and Culture - Lab Collect; Future    3. Essential hypertension  3 serial elevations of blood pressure and significant diastolic hypertension without any aggravating factors like significant pain does confirm the diagnosis of hypertension most likely essential hypertension he had normal renal functions at his last visit a month ago.  Given his minor tachycardia we will choose atenolol and had thiazide diuretic which can help with calcium oxalate stones.  He should return to clinic in 2 weeks for blood pressure recheck.  He did acknowledge that he was told that he has high cholesterol I did note that Dr. Bañuelos wanted him to return after diet changes in March.  Defer statin if needed to Dr. Bañuelos.  If he develops high-grade fever and significant pain he needs to go to the emergency room.  Not sure if he needs 24-hour urine work-up has this diagnosis of the stone has been established as a calcium oxalate stone.               July Potter MD  [facility]    Subjective   Tyrone is a 58 year old, presenting for the following health issues:    In each 24-hour urine collection, the urine volume, pH, and excretion of calcium, uric acid, citrate, oxalate, sodium, potassium, magnesium, and creatinine (to assess the completeness of the collection) should be measured.   Recheck Medication, Flank Pain, Kidney Problem (Blood in urine, history of stones), and Letter for School/Work      10/26/2023    11:34 AM   Additional Questions   Roomed by michael laura       Kidney Problem    History of Present Illness       Reason for visit:  Blood in urine and flank  "pain    He eats 2-3 servings of fruits and vegetables daily.He consumes 0 sweetened beverage(s) daily.He exercises with enough effort to increase his heart rate 10 to 19 minutes per day.  He exercises with enough effort to increase his heart rate 3 or less days per week.   He is taking medications regularly.       Patient Active Problem List   Diagnosis    Nephrolithiasis    Allergic Rhinitis    Mixed hyperlipidemia    Mild intermittent asthma without complication    Class 2 severe obesity with serious comorbidity in adult, unspecified BMI, unspecified obesity type (H)    Mild persistent asthma, unspecified whether complicated     Current Outpatient Medications   Medication    albuterol (PROAIR HFA/PROVENTIL HFA/VENTOLIN HFA) 108 (90 Base) MCG/ACT inhaler        budesonide-formoterol (SYMBICORT) 160-4.5 MCG/ACT Inhaler    cetirizine (ZYRTEC) 10 MG tablet        tamsulosin (FLOMAX) 0.4 MG capsule    acetaminophen (TYLENOL) 500 MG tablet     No current facility-administered medications for this visit.               Review of Systems         Objective    BP (!) 173/116 (BP Location: Left arm, Patient Position: Sitting, Cuff Size: Adult Large)   Pulse 106   Temp 97.9  F (36.6  C)   Resp 16   Ht 1.6 m (5' 3\")   Wt 91.8 kg (202 lb 4.8 oz)   SpO2 98%   BMI 35.84 kg/m    Body mass index is 35.84 kg/m .  Physical Exam   GENERAL: healthy, alert and no distress  NECK: no adenopathy, no asymmetry, masses, or scars and thyroid normal to palpation  RESP: lungs clear to auscultation - no rales, rhonchi or wheezes  CV: regular rate and rhythm, normal S1 S2, no S3 or S4, no murmur, click or rub, no peripheral edema and peripheral pulses strong  ABDOMEN: soft, nontender, no hepatosplenomegaly, no masses and bowel sounds normal  MS: no gross musculoskeletal defects noted, no edema                      "

## 2023-10-26 NOTE — LETTER
10/26October 26, 2023      Tyrone Lopez  [address] [place] [address]        To Whom It May Concern:    Tyrone Lopez  was seen on 10/26 Please excuse him  until 10- inclusive due to illness.        Sincerely,        July Potter MD

## 2023-10-27 ENCOUNTER — APPOINTMENT (OUTPATIENT)
Dept: INTERPRETER SERVICES | Facility: CLINIC | Age: 58
End: 2023-10-27
Payer: COMMERCIAL

## 2023-10-27 ENCOUNTER — TELEPHONE (OUTPATIENT)
Dept: INTERNAL MEDICINE | Facility: CLINIC | Age: 58
End: 2023-10-27
Payer: COMMERCIAL

## 2023-10-27 DIAGNOSIS — N13.30 HYDRONEPHROSIS, UNSPECIFIED HYDRONEPHROSIS TYPE: Primary | ICD-10-CM

## 2023-10-27 LAB
ANION GAP SERPL CALCULATED.3IONS-SCNC: 18 MMOL/L (ref 7–15)
BUN SERPL-MCNC: 18.9 MG/DL (ref 8–23)
CALCIUM SERPL-MCNC: 9.9 MG/DL (ref 8.8–10.2)
CHLORIDE SERPL-SCNC: 104 MMOL/L (ref 98–107)
CREAT SERPL-MCNC: 1.42 MG/DL (ref 0.67–1.17)
DEPRECATED HCO3 PLAS-SCNC: 19 MMOL/L (ref 22–29)
EGFRCR SERPLBLD CKD-EPI 2021: 57 ML/MIN/1.73M2
GLUCOSE SERPL-MCNC: 152 MG/DL (ref 70–99)
POTASSIUM SERPL-SCNC: 4.7 MMOL/L (ref 3.4–5.3)
SODIUM SERPL-SCNC: 141 MMOL/L (ref 135–145)
TSH SERPL DL<=0.005 MIU/L-ACNC: 1.27 UIU/ML (ref 0.3–4.2)
URATE SERPL-MCNC: 9.1 MG/DL (ref 3.4–7)

## 2023-10-27 NOTE — TELEPHONE ENCOUNTER
Called patient and left phoe number via [employer] interpretor about CT scan results sowing hydronephrosis from stone  as he is able to drink orally , stone is lss than 10mm and  he is not in severe pain ,  or have UTI , continue conservative care , repeat creatinine on mondy and urgent urology consulation . Recommend ER if he has flank pain , vomiting or fever

## 2023-10-30 ENCOUNTER — TELEPHONE (OUTPATIENT)
Dept: UROLOGY | Facility: CLINIC | Age: 58
End: 2023-10-30

## 2023-10-30 ENCOUNTER — LAB (OUTPATIENT)
Dept: LAB | Facility: CLINIC | Age: 58
End: 2023-10-30
Payer: COMMERCIAL

## 2023-10-30 DIAGNOSIS — R31.0 GROSS HEMATURIA: Primary | ICD-10-CM

## 2023-10-30 DIAGNOSIS — N13.30 HYDRONEPHROSIS, UNSPECIFIED HYDRONEPHROSIS TYPE: ICD-10-CM

## 2023-10-30 DIAGNOSIS — R10.9 FLANK PAIN: ICD-10-CM

## 2023-10-30 LAB
ANION GAP SERPL CALCULATED.3IONS-SCNC: 14 MMOL/L (ref 7–15)
BUN SERPL-MCNC: 40.9 MG/DL (ref 8–23)
CALCIUM SERPL-MCNC: 9.8 MG/DL (ref 8.8–10.2)
CHLORIDE SERPL-SCNC: 103 MMOL/L (ref 98–107)
CREAT SERPL-MCNC: 1.96 MG/DL (ref 0.67–1.17)
DEPRECATED HCO3 PLAS-SCNC: 24 MMOL/L (ref 22–29)
EGFRCR SERPLBLD CKD-EPI 2021: 38 ML/MIN/1.73M2
GLUCOSE SERPL-MCNC: 115 MG/DL (ref 70–99)
POTASSIUM SERPL-SCNC: 4.2 MMOL/L (ref 3.4–5.3)
SODIUM SERPL-SCNC: 141 MMOL/L (ref 135–145)

## 2023-10-30 PROCEDURE — 80048 BASIC METABOLIC PNL TOTAL CA: CPT

## 2023-10-30 PROCEDURE — 36415 COLL VENOUS BLD VENIPUNCTURE: CPT

## 2023-10-30 NOTE — RESULT ENCOUNTER NOTE
I called him on Friday via "[employer] interpretor about his CT results . Please call again and let him know Ctconfirmed another stone blocking left kidney causing swelling and slightly abnormal creatinine . He needs to go back to urology but meanwhile to drink lots of water so it flushes out itself . He needs to repeat BMP this week to monitor creatinine

## 2023-10-30 NOTE — TELEPHONE ENCOUNTER
[facility] Call Center    Phone Message    May a detailed message be left on voicemail: yes     Reason for Call: Symptoms or Concerns     If patient has red-flag symptoms, warm transfer to triage line    Current symptom or concern: kidney stone on left side - urinating blood - was in ER 10/26- please review imaging    Symptoms have been present for:  Couple of day(s)    Has patient previously been seen for this? Yes    By Coral    Are there any new or worsening symptoms? No    Action Taken: Other: Uro    Travel Screening: Not Applicable

## 2023-10-30 NOTE — TELEPHONE ENCOUNTER
Called patient regarding central call message to [facility].  He is scheduled with Brooke Davis NP for 10/31/23.

## 2023-10-31 ENCOUNTER — TELEPHONE (OUTPATIENT)
Dept: UROLOGY | Facility: CLINIC | Age: 58
End: 2023-10-31

## 2023-10-31 ENCOUNTER — VIRTUAL VISIT (OUTPATIENT)
Dept: UROLOGY | Facility: CLINIC | Age: 58
End: 2023-10-31
Payer: COMMERCIAL

## 2023-10-31 DIAGNOSIS — N20.0 CALCIUM OXALATE KIDNEY STONES: ICD-10-CM

## 2023-10-31 DIAGNOSIS — N20.1 CALCULUS OF PROXIMAL LEFT URETER: Primary | ICD-10-CM

## 2023-10-31 PROCEDURE — 99443 PR PHYSICIAN TELEPHONE EVALUATION 21-30 MIN: CPT | Mod: 93 | Performed by: NURSE PRACTITIONER

## 2023-10-31 NOTE — TELEPHONE ENCOUNTER
Spoke with: Gabi CHANEY MA spoke to patient in [facility] [place], [place] and informed him of Surgery date and location      Date of surgery: Friday November 3 2023       Location: [facility]      Informed patient they will need a adult :YES       Pre op with provider: Patient saw PCP in person 10-26       H&P Scheduled in PAC- NA        Pre procedure covid : Not required       Additional imaging: DONE        Surgery Packet : Patient informed over the phone      Additional comments:

## 2023-10-31 NOTE — TELEPHONE ENCOUNTER
Patient was informed of 11/3/23 left ureteroscopy stone removal surgery at the [facility].  URS education was also reviewed with patient with understanding.  Patient is in agreement with date and time of surgery.

## 2023-10-31 NOTE — NURSING NOTE
Is the patient currently in the state of [place]? YES    Visit mode:TELEPHONE    If the visit is dropped, the patient can be reconnected by: TELEPHONE VISIT: Phone number:   Telephone Information:   Mobile [phone]       Will anyone else be joining the visit? NO  (If patient encounters technical issues they should call [phone] :[phone])    How would you like to obtain your AVS? MyChart    Are changes needed to the allergy or medication list? No    Reason for visit: Follow Up    Pt. Reports pain at a level of 2 today, states pain is located in left kidney.     Liliana WATTERSF

## 2023-10-31 NOTE — PROGRESS NOTES
Urology Telephonic Office Visit    Telephonic-Visit Details    Type of service:  Telephonic Visit    Telephonic Start Time: 1041    Telephonic End Time: 1107    Originating Location (pt. Location): Home    Distant Location (provider location):  Off-site     Platform used for Telephonic Visit: [employer]           Assessment and Plan:     Assessment: 58 year old male with an obstructing left 6mm proximal ureteral stone.      Plan:  -Reviewed CT scan with patient. Noted left 6mm proximal ureteral stone.   -Discussed concern of kidney function and obstructing ureteral stone.  -Discussed treatment options including observation with MET vs. ureteral stent placement vs. ureteroscopy and laser lithotripsy. I counseled the patient regarding the potential need for a ureteral stent after treatment and the necessity of removing the stent after surgery. I also discussed the possibility of additional procedures to render the patient stone free.  After discussing risks, benefits, and alternatives to treatment, patient elects to proceed with left URS/LL.  -If having severe flank pain, fevers, chills, nausea, or vomiting please notify Urology clinic or be seen in the ER.     Brooke Davis CNP  Department of Urology  October 31, 2023    I spent a total of 35 minutes spent on the date of the encounter doing chart review, history and exam, documentation, and further activities as noted above.          Chief Complaint:   Left Ureteral Stone         History of Present Illness:    Tyrone Lopez is a pleasant 58 year old male who presents with concerns of a left ureteral stone. PMHx: Asthma and HLD    Mr. Lopez was seen by his PCP on 10/26/3 for concerns of gross hematuria with flank pain.     CT scan on 10/26/23 (images personally reviewed) revealed a left 6mm proximal ureteral stone with noted hydronephrosis. No noted right or left renal stones.     Notes flank pain with activity like bending over. Noted gross hematuria-able to fully empty  bladder. Denies any fevers, chills, nausea, vomiting, or dysuria.     sCr 1.96 (Baseline 1.1) and GFR 38 (baseline >60)    Last stone episode was in April 2023 which he was able to pass spontaneously.     Stone Analysis:   100% Calcium Oxalate         Past Medical History:   Asthma and HLD         Past Surgical History:     Past Surgical History:   Procedure Laterality Date    APPENDECTOMY              Medications     Current Outpatient Medications   Medication    acetaminophen (TYLENOL) 500 MG tablet    albuterol (PROAIR HFA/PROVENTIL HFA/VENTOLIN HFA) 108 (90 Base) MCG/ACT inhaler    atenolol-chlorthalidone (TENORETIC) 50-25 MG tablet    budesonide-formoterol (SYMBICORT) 160-4.5 MCG/ACT Inhaler    cetirizine (ZYRTEC) 10 MG tablet    predniSONE (DELTASONE) 20 MG tablet    tamsulosin (FLOMAX) 0.4 MG capsule     No current facility-administered medications for this visit.            Family History:   No family history on file.         Social History:     Social History     Socioeconomic History    Marital status:      Spouse name: Not on file    Number of children: Not on file    Years of education: Not on file    Highest education level: Not on file   Occupational History    Not on file   Tobacco Use    Smoking status: Never    Smokeless tobacco: Never   Vaping Use    Vaping Use: Never used   Substance and Sexual Activity    Alcohol use: No    Drug use: No    Sexual activity: Not on file   Other Topics Concern    Not on file   Social History Narrative    Not on file     Social Determinants of Health     Financial Resource Strain: Low Risk  (10/26/2023)    Financial Resource Strain     Within the past 12 months, have you or your family members you live with been unable to get utilities (heat, electricity) when it was really needed?: No   Food Insecurity: Low Risk  (10/26/2023)    Food Insecurity     Within the past 12 months, did you worry that your food would run out before you got money to buy more?: No      Within the past 12 months, did the food you bought just not last and you didn t have money to get more?: No   Transportation Needs: Low Risk  (10/26/2023)    Transportation Needs     Within the past 12 months, has lack of transportation kept you from medical appointments, getting your medicines, non-medical meetings or appointments, work, or from getting things that you need?: No   Physical Activity: Not on file   Stress: Not on file   Social Connections: Not on file   Interpersonal Safety: Low Risk  (10/26/2023)    Interpersonal Safety     Do you feel physically and emotionally safe where you currently live?: Yes     Within the past 12 months, have you been hit, slapped, kicked or otherwise physically hurt by someone?: No     Within the past 12 months, have you been humiliated or emotionally abused in other ways by your partner or ex-partner?: No   Housing Stability: Low Risk  (10/26/2023)    Housing Stability     Do you have housing? : Yes     Are you worried about losing your housing?: No            Allergies:   Patient has no known allergies.         Review of Systems:  From intake questionnaire   Negative 14 system review except as noted on HPI, nurse's note.         Physical Exam:     The rest of a comprehensive physical examination is deferred due to telephonic visit restrictions      Labs:    I personally reviewed all applicable laboratory data and went over findings with patient  Significant for:    CBC RESULTS:  Recent Labs   Lab Test 10/26/23  1251   WBC 14.1*   HGB 15.5           BMP RESULTS:  Recent Labs   Lab Test 10/30/23  1211 10/26/23  1251 09/07/23  1038 01/06/22  1056    141 140 142   POTASSIUM 4.2 4.7 4.4 4.5   CHLORIDE 103 104 106 106   CO2 24 19* 20* 25   ANIONGAP 14 18* 14 11   * 152* 116* 104   BUN 40.9* 18.9 28.6* 19   CR 1.96* 1.42* 1.17 0.99   GFRESTIMATED 38* 57* 71 88   GASTON 9.8 9.9 9.3 9.5       UA RESULTS:   Recent Labs   Lab Test 10/26/23  1251   SG 1.025   URINEPH  5.5   NITRITE Negative   RBCU 25-50*   WBCU 0-5       CALCIUM RESULTS  Lab Results   Component Value Date    GASTON 9.8 10/30/2023    GASTON 9.9 10/26/2023    GASTON 9.3 09/07/2023           Imaging:    I personally reviewed all applicable imaging and went over the below findings with patient.    Results for orders placed or performed during the hospital encounter of 10/26/23   CT Abdomen Pelvis w/o Contrast    Narrative    EXAM: CT ABDOMEN PELVIS W/O CONTRAST  LOCATION: [facility]  DATE: 10/26/2023    INDICATION: flnak pain , fever, gross heamturia , rule out nephrolithaisis  COMPARISON: CT AP 04/20/2023, 03/16/2023 and going back to 2012.  TECHNIQUE: CT scan of the abdomen and pelvis was performed without IV contrast. Multiplanar reformats were obtained. Dose reduction techniques were used.  CONTRAST: None.    FINDINGS:   LOWER CHEST: Tiny pulmonary nodule in the right lateral costophrenic angle has been stable for many years and needs no follow-up. Mild mosaic appearance to the lung bases also again noted, reflecting small airway disease with chronic intralobular septal   thickening in the right lower lobe. No effusions.    HEPATOBILIARY: Normal.    PANCREAS: Normal.    SPLEEN: Normal.    ADRENAL GLANDS: Normal.    KIDNEYS/BLADDER: Moderate left-sided hydronephrosis and hydroureter with perinephric stranding. This is due to a 6 x 4 x 3 mm calculus in the proximal left ureter just distal to the ureteropelvic junction. There seems to be an adjacent punctate calculus   just superior to it as well (axial image  85, coronal image 60). Periureteral stranding also noted. No additional intrarenal calculi on the left. There is a punctate nonobstructing calculus in the lower pole of the right kidney. No bladder calculi.    BOWEL: Normal.    LYMPH NODES: Normal.    VASCULATURE: Moderate arterial calcifications. No aneurysm    PELVIC ORGANS: Normal.    MUSCULOSKELETAL: There is a tiny umbilical hernia  containing only fat. No suspicious bone lesions.      Impression    IMPRESSION:   1.  There is moderate left-sided proximal hydroureter and hydronephrosis due to a 6 x 4 x 3 mm calculus just distal to the ureteropelvic junction. There seems to be an adjacent, tiny calculus superior to this as well. This is the extent of the stone   burden on the left.  2.  There is a punctate nonobstructing calculus in the lower pole of the right kidney.  3.  Other noncritical findings as noted above.

## 2023-11-01 ENCOUNTER — APPOINTMENT (OUTPATIENT)
Dept: INTERPRETER SERVICES | Facility: CLINIC | Age: 58
End: 2023-11-01
Payer: COMMERCIAL

## 2023-11-02 ENCOUNTER — ALLIED HEALTH/NURSE VISIT (OUTPATIENT)
Dept: UROLOGY | Facility: CLINIC | Age: 58
End: 2023-11-02
Payer: COMMERCIAL

## 2023-11-02 ENCOUNTER — ANESTHESIA EVENT (OUTPATIENT)
Dept: SURGERY | Facility: AMBULATORY SURGERY CENTER | Age: 58
End: 2023-11-02
Payer: COMMERCIAL

## 2023-11-02 ENCOUNTER — VIRTUAL VISIT (OUTPATIENT)
Dept: GASTROENTEROLOGY | Facility: CLINIC | Age: 58
End: 2023-11-02
Attending: INTERNAL MEDICINE
Payer: COMMERCIAL

## 2023-11-02 VITALS — DIASTOLIC BLOOD PRESSURE: 87 MMHG | TEMPERATURE: 99 F | SYSTOLIC BLOOD PRESSURE: 168 MMHG | HEART RATE: 55 BPM

## 2023-11-02 VITALS — BODY MASS INDEX: 35.78 KG/M2 | WEIGHT: 202 LBS

## 2023-11-02 DIAGNOSIS — R10.9 FLANK PAIN: ICD-10-CM

## 2023-11-02 DIAGNOSIS — N20.0 CALCULUS OF KIDNEY: ICD-10-CM

## 2023-11-02 DIAGNOSIS — R31.0 GROSS HEMATURIA: ICD-10-CM

## 2023-11-02 DIAGNOSIS — E78.2 MIXED HYPERLIPIDEMIA: ICD-10-CM

## 2023-11-02 DIAGNOSIS — N20.1 CALCULUS OF PROXIMAL LEFT URETER: Primary | ICD-10-CM

## 2023-11-02 PROCEDURE — 97802 MEDICAL NUTRITION INDIV IN: CPT | Mod: VID | Performed by: DIETITIAN, REGISTERED

## 2023-11-02 PROCEDURE — 99207 PR NO CHARGE LOS: CPT | Mod: VID | Performed by: DIETITIAN, REGISTERED

## 2023-11-02 PROCEDURE — 99212 OFFICE O/P EST SF 10 MIN: CPT | Performed by: STUDENT IN AN ORGANIZED HEALTH CARE EDUCATION/TRAINING PROGRAM

## 2023-11-02 ASSESSMENT — PAIN SCALES - GENERAL
PAINLEVEL: NO PAIN (0)
PAINLEVEL: MILD PAIN (2)

## 2023-11-02 NOTE — PROGRESS NOTES
Tyrone Lopez is a 58 year old male who is being evaluated via a billable video/telephone visit.      Virtual Visit Details    Type of service:  Video Visit     Originating Location (pt. Location): Home    Distant Location (provider location):  Off-site    Platform used for Video Visit: [employer]    Note: pt was able to initially see and hear writer on video visit but then had sound trouble so converted/completed visit via phone visit with .    [facility] Outpatient Medical Nutrition Therapy      Time Spent:  55 minutes  Session Type:  Initial  Referring Physician:  July Potter MD  Reason for RD Visit:   gross hematuria, flank pain. (MD noted referral to discuss for HTN, high chol and kidney stone diets)     Video conducted with Acha[employer] .    Nutrition Assessment:  Patient is a 58 year old male with history that includes nephrolithiasis, and his MD noted calcium oxalate kidney stones, gross hematuria, flank pain, obesity, asthma, hyperlipidemia. He stated that he will have a surgery tomorrow for his kidney stones. His doctor referred him to discuss diet recommendations for ca oxalate kidney stones and diet for hypertension and elevated cholesterol. He stated that he eats 2 meals per day. Starts work at 2 PM, so eats his first meal at 11 AM and then has his lunch meal at work at 6 PM on his break. Otherwise he doesn't snack in between meals. He mostly drinks water and every 2-3 days will have a cup of 100% juice and occasional coffee. He doesn't drink alcohol. He eats a lot of rice at meals, so has been trying to reduce his rice intake and eating about a fistful of rice at meals now which is decreased amount because he does not want to gain weight/to help lose weight. He stated that he doesn't use sauces or condiments on his food at meals.     Patient Active Problem List   Diagnosis    Nephrolithiasis    Allergic Rhinitis    Mixed hyperlipidemia    Mild intermittent asthma without complication     "Class 2 severe obesity with serious comorbidity in adult, unspecified BMI, unspecified obesity type (H)    Mild persistent asthma, unspecified whether complicated    Calculus of proximal left ureter     Height:   Ht Readings from Last 1 Encounters:   10/26/23 1.6 m (5' 3\")     Weight:  Wt Readings from Last 10 Encounters:   11/02/23 91.6 kg (202 lb)   10/26/23 91.8 kg (202 lb 4.8 oz)   09/07/23 94.5 kg (208 lb 6.4 oz)   03/16/23 93 kg (205 lb)   04/20/22 89.9 kg (198 lb 1.6 oz)   03/30/22 90.2 kg (198 lb 14.4 oz)   02/16/22 91.2 kg (201 lb)   01/06/22 91.2 kg (201 lb)   11/04/19 86.6 kg (191 lb)   10/02/17 83.9 kg (185 lb)     BMI: Estimated body mass index is 35.78 kg/m  as calculated from the following:    Height as of 10/26/23: 1.6 m (5' 3\").    Weight as of this encounter: 91.6 kg (202 lb).    Diet Recall:  (some usual/recent meals/snacks/beverages): typically eats 2 meals per day.  Meal Food    Breakfast skips   Lunch 11 AM: wife cooks: rice, meat (chicken wings/beef/pork/fish/tofu/rare turkey), vegetables (kofi/spinach/green beans/cabbage/carrots/brussels spts),    Dinner 6 PM (brings this meal to work which is his lunch break): reduced to fistful rice, vegetables, meat/fish   Snacks Not usually.   Beverages Mostly 3 x 16 oz water, every 2-3 days has some 100% 1 cup juice, sometimes coffee   Alcohol Intake none     Frequency of eating/taking out meals: 1x every several months.     Labs:    Last Comprehensive Metabolic Panel:  Sodium   Date Value Ref Range Status   10/30/2023 141 135 - 145 mmol/L Final     Comment:     Reference intervals for this test were updated on 09/26/2023 to more accurately reflect our healthy population. There may be differences in the flagging of prior results with similar values performed with this method. Interpretation of those prior results can be made in the context of the updated reference intervals.      Potassium   Date Value Ref Range Status   10/30/2023 4.2 3.4 - 5.3 mmol/L " Final   01/06/2022 4.5 3.5 - 5.0 mmol/L Final     Chloride   Date Value Ref Range Status   10/30/2023 103 98 - 107 mmol/L Final   01/06/2022 106 98 - 107 mmol/L Final     Carbon Dioxide (CO2)   Date Value Ref Range Status   10/30/2023 24 22 - 29 mmol/L Final   01/06/2022 25 22 - 31 mmol/L Final     Anion Gap   Date Value Ref Range Status   10/30/2023 14 7 - 15 mmol/L Final   01/06/2022 11 5 - 18 mmol/L Final     Glucose   Date Value Ref Range Status   10/30/2023 115 (H) 70 - 99 mg/dL Final   01/06/2022 104 70 - 125 mg/dL Final     Urea Nitrogen   Date Value Ref Range Status   10/30/2023 40.9 (H) 8.0 - 23.0 mg/dL Final   01/06/2022 19 8 - 22 mg/dL Final     Creatinine   Date Value Ref Range Status   10/30/2023 1.96 (H) 0.67 - 1.17 mg/dL Final     GFR Estimate   Date Value Ref Range Status   10/30/2023 38 (L) >60 mL/min/1.73m2 Final     Calcium   Date Value Ref Range Status   10/30/2023 9.8 8.8 - 10.2 mg/dL Final     CBC RESULTS:   Recent Labs   Lab Test 10/26/23  1251   WBC 14.1*   RBC 5.25   HGB 15.5   HCT 45.3   MCV 86   MCH 29.5   MCHC 34.2   RDW 12.8          Pertinent Medications/vitamin and mineral supplements:      Current Outpatient Medications   Medication    albuterol (PROAIR HFA/PROVENTIL HFA/VENTOLIN HFA) 108 (90 Base) MCG/ACT inhaler    atenolol-chlorthalidone (TENORETIC) 50-25 MG tablet    budesonide-formoterol (SYMBICORT) 160-4.5 MCG/ACT Inhaler    cetirizine (ZYRTEC) 10 MG tablet    predniSONE (DELTASONE) 20 MG tablet    tamsulosin (FLOMAX) 0.4 MG capsule     No current facility-administered medications for this visit.     Food Allergies:  NKFA   Physical Activity:  not currently exercising    MALNUTRITION:  % Weight Loss:  Weight loss does not meet criteria for malnutrition -he doesn't want to gain weight and for example decreased his rice intake to help lose some weight.  % Intake:  No decreased intake noted  Subcutaneous Fat Loss:  unable to assess  Muscle Loss:  unable to assess  Fluid  Retention:  None noted    Malnutrition Diagnosis: Patient does not meet two of the above criteria necessary for diagnosing malnutrition  In Context of:  Chronic illness or disease    Nutrition Prescription: Nutrition Education     Nutrition Intervention      Provided diet education for calcium oxalate kidney stones, heart healthy diet for HTN and hyperlipidemia and tips for wt mgmt.    Answered patient's questions. Patient verbalized understanding of education provided. See Goals below.     Educational Materials Provided:  NCM: kidney stone nutrition therapy and Heart healthy nutrition therapy.    Goals:    1. Eat less sodium/lower sodium diet. (this is good for reducing urine calcium levels, and for following a heart healthy diet).    2. Do not eat excessive amounts of animal proteins at meal. Can aim for eating about 3 ounces of lean proteins at meals which is about the size of a deck of card and you can also include some plant-based proteins such as beans, tofu for some meals.    --also for heart healthy diet, limit higher fat meats (such as beef and fattier cuts of meals) and also limit/avoid more processed meats. Instead opt for skinless chicken breast, skinless turkey breast, fish, lean pork such a pork tenderloin and if occasionally having beef choose a leaner option such as 93% lean beef.     3. Limit the amount of high oxalate foods such as spinach, peanuts, nuts rhubarb, sweet potatoes, chocolate, tofu in diet. Note okay to eat some and do not need to avoid completely.    4. Eating a good source of calcium at a meal (especially if eating a higher oxalate food at that meal) can help because calcium can bind some oxalates.    --some good sources of calcium are low fat yogurt, skim or 1% lowfat milk/lactose free meal, unsweetened oat milk or unsweetened rice or soy milk, tofu, salmon, dried figs, broccoli or 100% orange juice with added calcium.    5. Continue to limit rice servings at meals to about fistful  size (1/2 cup-2/3 cup) per meal.    6. Drink at least 2-3 liters of water per day.    7. Incorporate adequate fruits and vegetables in your diet. Generally recommended to eat at least 5 or more servings per day of vegetables and fruit. If trying to lose weight, then recommended to increase non starchy vegetable intake especially.     --One tip is to make 1/2 of your plate at meals vegetables and fruit (and especially non starchy vegetables such as broccoli, carrots, cauliflower, peppers, brussels sprouts, lettuce, celery, green beans).      Nutrition Monitoring and Evaluation: Will monitor adherence to nutrition recommendations at future RD visits.     Further Medical Nutrition Therapy:  Follow-up as needed.    Patient was encouraged to contact RD with any further questions.    Farrah Michel MS, RD, LD

## 2023-11-02 NOTE — PATIENT INSTRUCTIONS
It was nice speaking with you today. Below are the nutrition recommendations we discussed for diet for calcium oxalate kidney stones and for following a heart healthy diet.     Please let me know if you have any additional questions.    Nutrition Recommendations    1. Eat less sodium/lower sodium diet. (this is good for reducing urine calcium levels, and for following a heart healthy diet).    2. Do not eat excessive amounts of animal proteins at meal. Can aim for eating about 3 ounces of lean proteins at meals which is about the size of a deck of card and you can also include some plant-based proteins such as beans, tofu for some meals.    --also for heart healthy diet, limit higher fat meats (such as beef and fattier cuts of meals) and also limit/avoid more processed meats. Instead opt for skinless chicken breast, skinless turkey breast, fish, lean pork such a pork tenderloin and if occasionally having beef choose a leaner option such as 93% lean beef.     3. Limit the amount of high oxalate foods such as spinach, peanuts, nuts rhubarb, sweet potatoes, chocolate, tofu in diet. Note okay to eat some and do not need to avoid completely.    4. Eating a good source of calcium at a meal (especially if eating a higher oxalate food at that meal) can help because calcium can bind some oxalates.    --some good sources of calcium are low fat yogurt, skim or 1% lowfat milk/lactose free meal, unsweetened oat milk or unsweetened rice or soy milk, tofu, salmon, dried figs, broccoli or 100% orange juice with added calcium.    5. Continue to limit rice servings at meals to about fistful size (1/2 cup-2/3 cup) per meal.    6. Drink at least 2-3 liters of water per day.    7. Incorporate adequate fruits and vegetables in your diet. Generally recommended to eat at least 5 or more servings per day of vegetables and fruit. If trying to lose weight, then recommended to increase non starchy vegetable intake especially.     --One tip is  to make 1/2 of your plate at meals vegetables and fruit (and especially non starchy vegetables such as broccoli, carrots, cauliflower, peppers, brussels sprouts, lettuce, celery, green beans).    Can follow up as needed.    If you would like to schedule a follow up appointment, please call [phone].    Thank you,    Farrah Michel, MS, RD, LD

## 2023-11-02 NOTE — NURSING NOTE
Is the patient currently in the state of [place]? YES    Visit mode:VIDEO    If the visit is dropped, the patient can be reconnected by: VIDEO VISIT: Text to cell phone:   Telephone Information:   Mobile [phone]       Will anyone else be joining the visit? NO  (If patient encounters technical issues they should call [phone]    How would you like to obtain your AVS? MyChart    Are changes needed to the allergy or medication list? No    Reason for visit: Video Visit (Consult)    Jayashree MATHEW

## 2023-11-02 NOTE — LETTER
11/2/2023         RE: Tyrone Lopez  [address]  [place] [address]        Dear Colleague,    Thank you for referring your patient, Tyrone Lopez, to the [facility] [place]. Please see a copy of my visit note below.    Tyrone Lopez is a 58 year old male who is being evaluated via a billable video visit.      [facility] Outpatient Medical Nutrition Therapy      Time Spent:  55 minutes  Session Type:  Initial  Referring Physician:  July Potter MD  Reason for RD Visit:   gross hematuria, flank pain. (MD noted referral to discuss for HTN, high chol and kidney stone diets)     Nutrition Assessment:  Patient is a 58 year old male with history that includes nephrolithiasis, and his MD noted calcium oxalate kidney stones, gross hematuria, flank pain, obesity, asthma, hyperlipidemia. He stated that he will have a surgery tomorrow for his kidney stones. His doctor referred him to discuss diet recommendations for ca oxalate kidney stones and diet for hypertension and elevated cholesterol. He stated that he eats 2 meals per day. Starts work at 2 PM, so eats his first meal at 11 AM and then has his lunch meal at work at 6 PM on his break. Otherwise he doesn't snack in between meals. He mostly drinks water and every 2-3 days will have a cup of 100% juice and occasional coffee. He doesn't drink alcohol. He eats a lot of rice at meals, so has been trying to reduce his rice intake and eating about a fistful of rice at meals now which is decreased amount because he does not want to gain weight/to help lose weight. He stated that he doesn't use sauces or condiments on his food at meals.     Patient Active Problem List   Diagnosis    Nephrolithiasis    Allergic Rhinitis    Mixed hyperlipidemia    Mild intermittent asthma without complication    Class 2 severe obesity with serious comorbidity in adult, unspecified BMI, unspecified obesity type (H)    Mild persistent asthma, unspecified whether  "complicated    Calculus of proximal left ureter     Height:   Ht Readings from Last 1 Encounters:   10/26/23 1.6 m (5' 3\")     Weight:  Wt Readings from Last 10 Encounters:   11/02/23 91.6 kg (202 lb)   10/26/23 91.8 kg (202 lb 4.8 oz)   09/07/23 94.5 kg (208 lb 6.4 oz)   03/16/23 93 kg (205 lb)   04/20/22 89.9 kg (198 lb 1.6 oz)   03/30/22 90.2 kg (198 lb 14.4 oz)   02/16/22 91.2 kg (201 lb)   01/06/22 91.2 kg (201 lb)   11/04/19 86.6 kg (191 lb)   10/02/17 83.9 kg (185 lb)     BMI: Estimated body mass index is 35.78 kg/m  as calculated from the following:    Height as of 10/26/23: 1.6 m (5' 3\").    Weight as of this encounter: 91.6 kg (202 lb).    Diet Recall:  (some usual/recent meals/snacks/beverages): typically eats 2 meals per day.  Meal Food    Breakfast skips   Lunch 11 AM: wife cooks: rice, meat (chicken wings/beef/pork/fish/tofu/rare turkey), vegetables (kofi/spinach/green beans/cabbage/carrots/brussels spts),    Dinner 6 PM (brings this meal to work which is his lunch break): reduced to fistful rice, vegetables, meat/fish   Snacks Not usually.   Beverages Mostly 3 x 16 oz water, every 2-3 days has some 100% 1 cup juice, sometimes coffee   Alcohol Intake none     Frequency of eating/taking out meals: 1x every several months.     Labs:    Last Comprehensive Metabolic Panel:  Sodium   Date Value Ref Range Status   10/30/2023 141 135 - 145 mmol/L Final     Comment:     Reference intervals for this test were updated on 09/26/2023 to more accurately reflect our healthy population. There may be differences in the flagging of prior results with similar values performed with this method. Interpretation of those prior results can be made in the context of the updated reference intervals.      Potassium   Date Value Ref Range Status   10/30/2023 4.2 3.4 - 5.3 mmol/L Final   01/06/2022 4.5 3.5 - 5.0 mmol/L Final     Chloride   Date Value Ref Range Status   10/30/2023 103 98 - 107 mmol/L Final   01/06/2022 106 98 - " 107 mmol/L Final     Carbon Dioxide (CO2)   Date Value Ref Range Status   10/30/2023 24 22 - 29 mmol/L Final   01/06/2022 25 22 - 31 mmol/L Final     Anion Gap   Date Value Ref Range Status   10/30/2023 14 7 - 15 mmol/L Final   01/06/2022 11 5 - 18 mmol/L Final     Glucose   Date Value Ref Range Status   10/30/2023 115 (H) 70 - 99 mg/dL Final   01/06/2022 104 70 - 125 mg/dL Final     Urea Nitrogen   Date Value Ref Range Status   10/30/2023 40.9 (H) 8.0 - 23.0 mg/dL Final   01/06/2022 19 8 - 22 mg/dL Final     Creatinine   Date Value Ref Range Status   10/30/2023 1.96 (H) 0.67 - 1.17 mg/dL Final     GFR Estimate   Date Value Ref Range Status   10/30/2023 38 (L) >60 mL/min/1.73m2 Final     Calcium   Date Value Ref Range Status   10/30/2023 9.8 8.8 - 10.2 mg/dL Final     CBC RESULTS:   Recent Labs   Lab Test 10/26/23  1251   WBC 14.1*   RBC 5.25   HGB 15.5   HCT 45.3   MCV 86   MCH 29.5   MCHC 34.2   RDW 12.8          Pertinent Medications/vitamin and mineral supplements:      Current Outpatient Medications   Medication    albuterol (PROAIR HFA/PROVENTIL HFA/VENTOLIN HFA) 108 (90 Base) MCG/ACT inhaler    atenolol-chlorthalidone (TENORETIC) 50-25 MG tablet    budesonide-formoterol (SYMBICORT) 160-4.5 MCG/ACT Inhaler    cetirizine (ZYRTEC) 10 MG tablet    predniSONE (DELTASONE) 20 MG tablet    tamsulosin (FLOMAX) 0.4 MG capsule     No current facility-administered medications for this visit.     Food Allergies:  NKFA   Physical Activity:  not currently exercising    MALNUTRITION:  % Weight Loss:  Weight loss does not meet criteria for malnutrition -he doesn't want to gain weight and for example decreased his rice intake to help lose some weight.  % Intake:  No decreased intake noted  Subcutaneous Fat Loss:  unable to assess  Muscle Loss:  unable to assess  Fluid Retention:  None noted    Malnutrition Diagnosis: Patient does not meet two of the above criteria necessary for diagnosing malnutrition  In Context of:   Chronic illness or disease    Nutrition Prescription: Nutrition Education     Nutrition Intervention      Provided diet education for calcium oxalate kidney stones, heart healthy diet for HTN and hyperlipidemia and tips for wt mgmt.    Answered patient's questions. Patient verbalized understanding of education provided. See Goals below.     Educational Materials Provided:  NCM: kidney stone nutrition therapy and Heart healthy nutrition therapy.    Goals:    1. Eat less sodium/lower sodium diet. (this is good for reducing urine calcium levels, and for following a heart healthy diet).    2. Do not eat excessive amounts of animal proteins at meal. Can aim for eating about 3 ounces of lean proteins at meals which is about the size of a deck of card and you can also include some plant-based proteins such as beans, tofu for some meals.    --also for heart healthy diet, limit higher fat meats (such as beef and fattier cuts of meals) and also limit/avoid more processed meats. Instead opt for skinless chicken breast, skinless turkey breast, fish, lean pork such a pork tenderloin and if occasionally having beef choose a leaner option such as 93% lean beef.     3. Limit the amount of high oxalate foods such as spinach, peanuts, nuts rhubarb, sweet potatoes, chocolate, tofu in diet. Note okay to eat some and do not need to avoid completely.    4. Eating a good source of calcium at a meal (especially if eating a higher oxalate food at that meal) can help because calcium can bind some oxalates.    --some good sources of calcium are low fat yogurt, skim or 1% lowfat milk/lactose free meal, unsweetened oat milk or unsweetened rice or soy milk, tofu, salmon, dried figs, broccoli or 100% orange juice with added calcium.    5. Continue to limit rice servings at meals to about fistful size (1/2 cup-2/3 cup) per meal.    6. Drink at least 2-3 liters of water per day.    7. Incorporate adequate fruits and vegetables in your diet.  Generally recommended to eat at least 5 or more servings per day of vegetables and fruit. If trying to lose weight, then recommended to increase non starchy vegetable intake especially.     --One tip is to make 1/2 of your plate at meals vegetables and fruit (and especially non starchy vegetables such as broccoli, carrots, cauliflower, peppers, brussels sprouts, lettuce, celery, green beans).      Nutrition Monitoring and Evaluation: Will monitor adherence to nutrition recommendations at future RD visits.     Further Medical Nutrition Therapy:  Follow-up as needed.    Patient was encouraged to contact RD with any further questions.            Again, thank you for allowing me to participate in the care of your patient.      Sincerely,    Farrah Michel RD

## 2023-11-02 NOTE — PROGRESS NOTES
Urology Note    59 yo male with 6 mm left proximal ureter stone and acute kidney injury  He had hypertension and was added on to clinic today for visit because surgery may potentially be cancelled tomorrow    BP (!) 168/87 (BP Location: Right arm, Patient Position: Sitting, Cuff Size: Adult Regular)   Pulse 55   Temp 99  F (37.2  C) (Oral)       No chest pain or trouble breathing  He reported chill but no fever and vitals normal here today  His UA was negative last week    I discussed with him that if BP remains high tomorrow and anesthesia is concerned about doing general then we should at-least do a ureter stent insertion under sedation and then work on better BP management then treat the stone. Explained the reasoning for this, I don't want to keep his kidney obstructed for much longer given the CARLOTTA    He's okay with this  Will see how his vitals are tomorrow and decide if proceeding with ureteroscopy or only stent    Brittany Torres MD

## 2023-11-03 ENCOUNTER — ANESTHESIA (OUTPATIENT)
Dept: SURGERY | Facility: AMBULATORY SURGERY CENTER | Age: 58
End: 2023-11-03
Payer: COMMERCIAL

## 2023-11-03 ENCOUNTER — HOSPITAL ENCOUNTER (OUTPATIENT)
Facility: AMBULATORY SURGERY CENTER | Age: 58
Discharge: HOME | End: 2023-11-03
Attending: UROLOGY
Payer: COMMERCIAL

## 2023-11-03 VITALS
SYSTOLIC BLOOD PRESSURE: 114 MMHG | DIASTOLIC BLOOD PRESSURE: 71 MMHG | OXYGEN SATURATION: 94 % | HEART RATE: 74 BPM | TEMPERATURE: 97 F | RESPIRATION RATE: 16 BRPM

## 2023-11-03 DIAGNOSIS — N20.1 CALCULUS OF PROXIMAL LEFT URETER: ICD-10-CM

## 2023-11-03 PROCEDURE — 82365 CALCULUS SPECTROSCOPY: CPT | Mod: 90 | Performed by: INTERNAL MEDICINE

## 2023-11-03 PROCEDURE — 52356 CYSTO/URETERO W/LITHOTRIPSY: CPT | Mod: LT | Performed by: UROLOGY

## 2023-11-03 PROCEDURE — 87086 URINE CULTURE/COLONY COUNT: CPT | Performed by: INTERNAL MEDICINE

## 2023-11-03 PROCEDURE — 99000 SPECIMEN HANDLING OFFICE-LAB: CPT | Performed by: INTERNAL MEDICINE

## 2023-11-03 PROCEDURE — 74420 UROGRAPHY RTRGR +-KUB: CPT | Mod: 26 | Performed by: UROLOGY

## 2023-11-03 DEVICE — URETERAL STENT
Type: IMPLANTABLE DEVICE | Site: URETER | Status: FUNCTIONAL
Brand: PERCUFLEX™ PLUS

## 2023-11-03 RX ORDER — CEFAZOLIN SODIUM 2 G/100ML
2 INJECTION, SOLUTION INTRAVENOUS
Status: DISCONTINUED | OUTPATIENT
Start: 2023-11-03 | End: 2023-11-04 | Stop reason: HOSPADM

## 2023-11-03 RX ORDER — EPHEDRINE SULFATE 50 MG/ML
INJECTION, SOLUTION INTRAVENOUS PRN
Status: DISCONTINUED | OUTPATIENT
Start: 2023-11-03 | End: 2023-11-03

## 2023-11-03 RX ORDER — ONDANSETRON 4 MG/1
4 TABLET, ORALLY DISINTEGRATING ORAL EVERY 30 MIN PRN
Status: DISCONTINUED | OUTPATIENT
Start: 2023-11-03 | End: 2023-11-04 | Stop reason: HOSPADM

## 2023-11-03 RX ORDER — ONDANSETRON 2 MG/ML
INJECTION INTRAMUSCULAR; INTRAVENOUS PRN
Status: DISCONTINUED | OUTPATIENT
Start: 2023-11-03 | End: 2023-11-03

## 2023-11-03 RX ORDER — ONDANSETRON 2 MG/ML
4 INJECTION INTRAMUSCULAR; INTRAVENOUS EVERY 30 MIN PRN
Status: DISCONTINUED | OUTPATIENT
Start: 2023-11-03 | End: 2023-11-04 | Stop reason: HOSPADM

## 2023-11-03 RX ORDER — LIDOCAINE 40 MG/G
CREAM TOPICAL
Status: DISCONTINUED | OUTPATIENT
Start: 2023-11-03 | End: 2023-11-04 | Stop reason: HOSPADM

## 2023-11-03 RX ORDER — SODIUM CHLORIDE, SODIUM LACTATE, POTASSIUM CHLORIDE, CALCIUM CHLORIDE 600; 310; 30; 20 MG/100ML; MG/100ML; MG/100ML; MG/100ML
INJECTION, SOLUTION INTRAVENOUS CONTINUOUS
Status: DISCONTINUED | OUTPATIENT
Start: 2023-11-03 | End: 2023-11-04 | Stop reason: HOSPADM

## 2023-11-03 RX ORDER — GLYCOPYRROLATE 0.2 MG/ML
INJECTION, SOLUTION INTRAMUSCULAR; INTRAVENOUS PRN
Status: DISCONTINUED | OUTPATIENT
Start: 2023-11-03 | End: 2023-11-03

## 2023-11-03 RX ORDER — LIDOCAINE HYDROCHLORIDE 20 MG/ML
INJECTION, SOLUTION INFILTRATION; PERINEURAL PRN
Status: DISCONTINUED | OUTPATIENT
Start: 2023-11-03 | End: 2023-11-03

## 2023-11-03 RX ORDER — DEXAMETHASONE SODIUM PHOSPHATE 4 MG/ML
INJECTION, SOLUTION INTRA-ARTICULAR; INTRALESIONAL; INTRAMUSCULAR; INTRAVENOUS; SOFT TISSUE PRN
Status: DISCONTINUED | OUTPATIENT
Start: 2023-11-03 | End: 2023-11-03

## 2023-11-03 RX ORDER — PROPOFOL 10 MG/ML
INJECTION, EMULSION INTRAVENOUS PRN
Status: DISCONTINUED | OUTPATIENT
Start: 2023-11-03 | End: 2023-11-03

## 2023-11-03 RX ORDER — HYDROMORPHONE HCL IN WATER/PF 6 MG/30 ML
0.4 PATIENT CONTROLLED ANALGESIA SYRINGE INTRAVENOUS EVERY 5 MIN PRN
Status: DISCONTINUED | OUTPATIENT
Start: 2023-11-03 | End: 2023-11-04 | Stop reason: HOSPADM

## 2023-11-03 RX ORDER — FENTANYL CITRATE 0.05 MG/ML
50 INJECTION, SOLUTION INTRAMUSCULAR; INTRAVENOUS EVERY 5 MIN PRN
Status: DISCONTINUED | OUTPATIENT
Start: 2023-11-03 | End: 2023-11-04 | Stop reason: HOSPADM

## 2023-11-03 RX ORDER — PROPOFOL 10 MG/ML
INJECTION, EMULSION INTRAVENOUS CONTINUOUS PRN
Status: DISCONTINUED | OUTPATIENT
Start: 2023-11-03 | End: 2023-11-03

## 2023-11-03 RX ORDER — HYDROMORPHONE HCL IN WATER/PF 6 MG/30 ML
0.2 PATIENT CONTROLLED ANALGESIA SYRINGE INTRAVENOUS EVERY 5 MIN PRN
Status: DISCONTINUED | OUTPATIENT
Start: 2023-11-03 | End: 2023-11-04 | Stop reason: HOSPADM

## 2023-11-03 RX ORDER — CEPHALEXIN 500 MG/1
500 CAPSULE ORAL 2 TIMES DAILY
Qty: 14 CAPSULE | Refills: 0 | Status: SHIPPED | OUTPATIENT
Start: 2023-11-03 | End: 2024-03-07

## 2023-11-03 RX ORDER — LABETALOL 20 MG/4 ML (5 MG/ML) INTRAVENOUS SYRINGE
10
Status: DISCONTINUED | OUTPATIENT
Start: 2023-11-03 | End: 2023-11-04 | Stop reason: HOSPADM

## 2023-11-03 RX ORDER — TAMSULOSIN HYDROCHLORIDE 0.4 MG/1
0.4 CAPSULE ORAL DAILY
Qty: 14 CAPSULE | Refills: 0 | Status: SHIPPED | OUTPATIENT
Start: 2023-11-03 | End: 2023-11-07

## 2023-11-03 RX ORDER — OXYCODONE HYDROCHLORIDE 5 MG/1
5 TABLET ORAL
Status: DISCONTINUED | OUTPATIENT
Start: 2023-11-03 | End: 2023-11-04 | Stop reason: HOSPADM

## 2023-11-03 RX ORDER — FENTANYL CITRATE 50 UG/ML
INJECTION, SOLUTION INTRAMUSCULAR; INTRAVENOUS PRN
Status: DISCONTINUED | OUTPATIENT
Start: 2023-11-03 | End: 2023-11-03

## 2023-11-03 RX ORDER — ACETAMINOPHEN 325 MG/1
975 TABLET ORAL ONCE
Status: COMPLETED | OUTPATIENT
Start: 2023-11-03 | End: 2023-11-03

## 2023-11-03 RX ORDER — FENTANYL CITRATE 0.05 MG/ML
25 INJECTION, SOLUTION INTRAMUSCULAR; INTRAVENOUS EVERY 5 MIN PRN
Status: DISCONTINUED | OUTPATIENT
Start: 2023-11-03 | End: 2023-11-04 | Stop reason: HOSPADM

## 2023-11-03 RX ORDER — CEFAZOLIN SODIUM 2 G/100ML
2 INJECTION, SOLUTION INTRAVENOUS SEE ADMIN INSTRUCTIONS
Status: DISCONTINUED | OUTPATIENT
Start: 2023-11-03 | End: 2023-11-04 | Stop reason: HOSPADM

## 2023-11-03 RX ADMIN — Medication 100 MCG: at 15:57

## 2023-11-03 RX ADMIN — Medication 100 MCG: at 16:00

## 2023-11-03 RX ADMIN — FENTANYL CITRATE 50 MCG: 50 INJECTION, SOLUTION INTRAMUSCULAR; INTRAVENOUS at 15:34

## 2023-11-03 RX ADMIN — Medication 100 MCG: at 15:43

## 2023-11-03 RX ADMIN — Medication 100 MCG: at 15:31

## 2023-11-03 RX ADMIN — Medication 100 MCG: at 15:28

## 2023-11-03 RX ADMIN — ONDANSETRON 4 MG: 2 INJECTION INTRAMUSCULAR; INTRAVENOUS at 15:26

## 2023-11-03 RX ADMIN — CEFAZOLIN SODIUM 2 G: 2 INJECTION, SOLUTION INTRAVENOUS at 15:17

## 2023-11-03 RX ADMIN — FENTANYL CITRATE 50 MCG: 50 INJECTION, SOLUTION INTRAMUSCULAR; INTRAVENOUS at 15:22

## 2023-11-03 RX ADMIN — Medication 100 MCG: at 15:52

## 2023-11-03 RX ADMIN — ACETAMINOPHEN 975 MG: 325 TABLET ORAL at 13:32

## 2023-11-03 RX ADMIN — Medication 200 MCG: at 15:45

## 2023-11-03 RX ADMIN — Medication 100 MCG: at 15:33

## 2023-11-03 RX ADMIN — SODIUM CHLORIDE, SODIUM LACTATE, POTASSIUM CHLORIDE, CALCIUM CHLORIDE: 600; 310; 30; 20 INJECTION, SOLUTION INTRAVENOUS at 14:48

## 2023-11-03 RX ADMIN — Medication 100 MCG: at 15:39

## 2023-11-03 RX ADMIN — EPHEDRINE SULFATE 5 MG: 50 INJECTION, SOLUTION INTRAVENOUS at 15:32

## 2023-11-03 RX ADMIN — PROPOFOL 200 MG: 10 INJECTION, EMULSION INTRAVENOUS at 15:22

## 2023-11-03 RX ADMIN — GLYCOPYRROLATE 0.1 MG: 0.2 INJECTION, SOLUTION INTRAMUSCULAR; INTRAVENOUS at 15:29

## 2023-11-03 RX ADMIN — Medication 100 MCG: at 16:08

## 2023-11-03 RX ADMIN — PROPOFOL 200 MCG/KG/MIN: 10 INJECTION, EMULSION INTRAVENOUS at 15:24

## 2023-11-03 RX ADMIN — LIDOCAINE HYDROCHLORIDE 3 ML: 20 INJECTION, SOLUTION INFILTRATION; PERINEURAL at 15:22

## 2023-11-03 RX ADMIN — DEXAMETHASONE SODIUM PHOSPHATE 4 MG: 4 INJECTION, SOLUTION INTRA-ARTICULAR; INTRALESIONAL; INTRAMUSCULAR; INTRAVENOUS; SOFT TISSUE at 15:24

## 2023-11-03 RX ADMIN — GLYCOPYRROLATE 0.1 MG: 0.2 INJECTION, SOLUTION INTRAMUSCULAR; INTRAVENOUS at 15:33

## 2023-11-03 NOTE — ANESTHESIA POSTPROCEDURE EVALUATION
Patient: Tyrone Lopez    Procedure: Procedure(s):  Cystoscopy, Left Ureteroscopy, Left Retrograde Pyelogram, Left Laser Lithotripsy, Stone Basket Extraction, Left Ureteral Stent Placement       Anesthesia Type:  General    Note:  Disposition: Outpatient   Postop Pain Control: Uneventful            Sign Out: Well controlled pain   PONV: No   Neuro/Psych: Uneventful            Sign Out: Acceptable/Baseline neuro status   Airway/Respiratory: Uneventful            Sign Out: Acceptable/Baseline resp. status   CV/Hemodynamics: Uneventful            Sign Out: Acceptable CV status; No obvious hypovolemia; No obvious fluid overload   Other NRE: NONE   DID A NON-ROUTINE EVENT OCCUR? No           Last vitals:  Vitals Value Taken Time   /65 11/03/23 1627   Temp 97.3  F (36.3  C) 11/03/23 1616   Pulse 89 11/03/23 1623   Resp 16 11/03/23 1627   SpO2 96 % 11/03/23 1627   Vitals shown include unfiled device data.    Electronically Signed By: Rory Hernández MD  November 3, 2023  4:33 PM

## 2023-11-03 NOTE — ANESTHESIA PREPROCEDURE EVALUATION
Anesthesia Pre-Procedure Evaluation    Patient: Tyrone Lopez   MRN: [id number] : 1965        Procedure : Procedure(s):  Cystoscopy, Left Ureteroscopy, Left Retrograde Pyelogram, Left Laser Lithotripsy, Possible Left Ureteral Stent Placement          Past Medical History:   Diagnosis Date    Hypertension     Uncomplicated asthma       Past Surgical History:   Procedure Laterality Date    APPENDECTOMY        No Known Allergies   Social History     Tobacco Use    Smoking status: Never    Smokeless tobacco: Never   Substance Use Topics    Alcohol use: No      Wt Readings from Last 1 Encounters:   23 91.6 kg (202 lb)        Anesthesia Evaluation            ROS/MED HX  ENT/Pulmonary:     (+)                     asthma  Treatment: Inhaler prn,                 Neurologic:  - neg neurologic ROS     Cardiovascular:     (+)  hypertension- -   -  - -                                   (-) murmur   METS/Exercise Tolerance:     Hematologic:  - neg hematologic  ROS     Musculoskeletal:  - neg musculoskeletal ROS     GI/Hepatic:       Renal/Genitourinary:     (+)       Nephrolithiasis ,       Endo:     (+)               Obesity,       Psychiatric/Substance Use:  - neg psychiatric ROS     Infectious Disease:       Malignancy:  - neg malignancy ROS     Other:  - neg other ROS          Physical Exam    Airway  airway exam normal      Mallampati: II   TM distance: > 3 FB   Neck ROM: full   Mouth opening: > 3 cm    Respiratory Devices and Support         Dental       (+) Completely normal teeth      Cardiovascular   cardiovascular exam normal       Rhythm and rate: regular and normal (-) no murmur    Pulmonary   pulmonary exam normal        breath sounds clear to auscultation           OUTSIDE LABS:  CBC:   Lab Results   Component Value Date    WBC 14.1 (H) 10/26/2023    HGB 15.5 10/26/2023    HCT 45.3 10/26/2023     10/26/2023     BMP:   Lab Results   Component Value Date     10/30/2023     10/26/2023     "POTASSIUM 4.2 10/30/2023    POTASSIUM 4.7 10/26/2023    CHLORIDE 103 10/30/2023    CHLORIDE 104 10/26/2023    CO2 24 10/30/2023    CO2 19 (L) 10/26/2023    BUN 40.9 (H) 10/30/2023    BUN 18.9 10/26/2023    CR 1.96 (H) 10/30/2023    CR 1.42 (H) 10/26/2023     (H) 10/30/2023     (H) 10/26/2023     COAGS: No results found for: \"PTT\", \"INR\", \"FIBR\"  POC: No results found for: \"BGM\", \"HCG\", \"HCGS\"  HEPATIC:   Lab Results   Component Value Date    ALBUMIN 4.6 09/07/2023    PROTTOTAL 7.8 09/07/2023    ALT 42 09/07/2023    AST 29 09/07/2023    ALKPHOS 78 09/07/2023    BILITOTAL 0.4 09/07/2023     OTHER:   Lab Results   Component Value Date    GASTON 9.8 10/30/2023    TSH 1.27 10/26/2023       Anesthesia Plan    ASA Status:  2    NPO Status:  NPO Appropriate    Anesthesia Type: General.     - Airway: LMA   Induction: Intravenous.   Maintenance: Balanced.        Consents    Anesthesia Plan(s) and associated risks, benefits, and realistic alternatives discussed. Questions answered and patient/representative(s) expressed understanding.     - Discussed: Risks, Benefits and Alternatives for BOTH SEDATION and the PROCEDURE were discussed     - Discussed with:  Patient            Postoperative Care    Pain management: IV analgesics, Oral pain medications.   PONV prophylaxis: Ondansetron (or other 5HT-3), Dexamethasone or Solumedrol     Comments:                Rory Hernández MD  "

## 2023-11-03 NOTE — ANESTHESIA CARE TRANSFER NOTE
Patient: Tyrone Lopez    Procedure: Procedure(s):  Cystoscopy, Left Ureteroscopy, Left Retrograde Pyelogram, Left Laser Lithotripsy, Stone Basket Extraction, Left Ureteral Stent Placement       Diagnosis: Calculus of proximal left ureter [N20.1]  Diagnosis Additional Information: No value filed.    Anesthesia Type:   General     Note:    Oropharynx: oropharynx clear of all foreign objects  Level of Consciousness: drowsy  Oxygen Supplementation: face mask  Level of Supplemental Oxygen (L/min / FiO2): 8  Independent Airway: airway patency satisfactory and stable  Dentition: dentition unchanged  Vital Signs Stable: post-procedure vital signs reviewed and stable  Report to RN Given: handoff report given  Patient transferred to: PACU    Handoff Report: Identifed the Patient, Identified the Reponsible Provider, Reviewed the pertinent medical history, Discussed the surgical course, Reviewed Intra-OP anesthesia mangement and issues during anesthesia, Set expectations for post-procedure period and Allowed opportunity for questions and acknowledgement of understanding      Vitals:  Vitals Value Taken Time   /66 11/03/23 1616   Temp 97.3  F (36.3  C) 11/03/23 1616   Pulse 70    Resp 16 11/03/23 1616   SpO2 99 % 11/03/23 1616       Electronically Signed By: SAKINA Hawthorne CRNA  November 3, 2023  4:18 PM

## 2023-11-03 NOTE — OP NOTE
PREOPERATIVE DIAGNOSIS:  Left ureteral stone  POSTOPERATIVE DIAGNOSIS: Same  PROCEDURES PERFORMED:    Cystoscopy  Left retrograde pyelogram with interpretation of imaging  Left thulium laser lithotripsy with stone basket extraction  Left ureteral stent placement    STAFF SURGEON: Rory Noonan MD  RESIDENT(S) none    ANESTHESIA: General  ESTIMATED BLOOD LOSS: 1 ml  COMPLICATIONS: None  SPECIMEN: Left kidney urine for culture, left ureteral stone for analysis  URETERAL STENT(S): 6 x 24 double-J left ureteral stent      SIGNIFICANT FINDINGS: Left ureteral stone    BRIEF OPERATIVE INDICATIONS: Patient presented with an obstructing left ureteral stone.  After a discussion of all risks, benefits, and alternatives, the patient elected to proceed with definitive stone management. The patient understands the potential need for more than one procedure to eliminate all stone burden.      DESCRIPTION OF PROCEDURE:  After informed consent was obtained, the patient was transported to the operating room & placed supine on the table. After adequate anesthesia was induced, the patient was placed in lithotomy and prepped and draped in the usual sterile fashion. A timeout was taken to confirm correct patient, procedure and laterality. Pre-operative IV antibiotics were administered.     We started the procedure by performing cystoscopy.  The bladder was anatomically normal.  The left ureteral orifice was cannulated with a sensor wire which we passed to the left kidney.  Retrograde pyelogram showed moderate hydronephrosis.  The urine was slightly brownish tinged consistent with old blood and high-grade obstruction.  We sent it for culture. It cleared quite quickly.  We gently dilated the distal ureter with the 8 and 10 dilator.  We passed the flexible ureteroscope adjacent to the wire and identified the stone in the mid to proximal ureter.  We used the 200 thulium laser to perform lithotripsy in the setting of 1 J and 10 Hz.  The  stone fragmented quite easily.  We subsequently made numerous passes with a basket to extract all pieces.  We also in the kidney identifying several lower pole calculi which we extracted as well.  Final retrograde pyelogram showed no extravasation.  Final pullback ureteroscopy revealed a patent uninjured ureter.  We positioned a 6 Setswana by 24 cm double-J stent which we ensured had full curls.  We did leave a string  The bladder was drained and the patient was awoken from anesthesia and transported to the postanesthesia care unit in stable condition.     POSTOP PLAN:  -Stent removal in 1 week via string  -Follow-up 6 weeks with imaging

## 2023-11-03 NOTE — PROGRESS NOTES
I personally met with Tyrone Lopez and discussed their current medical situation.     We reviewed the nature of planned surgery, the risks benefits and complications and treatment alternatives.      Shared decision made to proceed with left ureteroscopy    We also reviewed the following financial disclosures potentially applicable to their surgery  I informed the patient that I do have a financial consulting relationship with "[employer] a medical device company that makes products which could be used during the procedure  I presented an opportunity to ask questions  and informed them that they were capable of rescinding their consent or not proceeding without penalty if they desire to do so.

## 2023-11-03 NOTE — ANESTHESIA PROCEDURE NOTES
Airway       Patient location during procedure: OR  Staff -        CRNA: Ilana Jo APRN CRNA       Performed By: CRNA  Consent for Airway        Urgency: elective  Indications and Patient Condition       Indications for airway management: ramona-procedural       Induction type:intravenous       Mask difficulty assessment: 0 - not attempted    Final Airway Details       Final airway type: supraglottic airway    Supraglottic Airway Details        Type: LMA       LMA size: 5    Post intubation assessment        Placement verified by: capnometry, equal breath sounds and chest rise        Number of attempts at approach: 1       Number of other approaches attempted: 0       Secured with: silk tape       Ease of procedure: easy       Dentition: Intact

## 2023-11-03 NOTE — DISCHARGE INSTRUCTIONS
"  If you have any questions or concerns regarding your procedure, please contact Dr. Noonan, his office number is [phone] or [phone].    You received 975 mg of acetaminophen (Tylenol) at 1:32 pm. Please do not take an additional dose of Tylenol until after 7:32 PM.    Do not exceed 4,000 mg of acetaminophen in a 24 hour period, keep in mind that acetaminophen can also be found in many over-the-counter cold medications as well as narcotics that may be given for pain.    Stent Removal With String    -Remove the stent in the morning on the specific day as directed by your doctor, usually 6 days.  -Take Tylenol or Ibuprofen and drink fluids 1 hour prior to removing your stent at home.  -Gently pull (slow but purposeful) on the string in the shower while urinating until the stent is completely removed.   -Call the [facility] office if you have questions or concerns at [phone].    What To Expect After Your Stent Is Removed    After stent removal, urine may be bloody and possibly have some bloody clots.  You may experience an \"achy\" pain due to urethral spasms.  This generally only lasts a few hours, but should resolve over the next 2-3 days.    When to call your healthcare provider    Call your healthcare provider if:    Fever of 101.5 F (38.6 C) or higher  Bleeding from the site that doesn't stop  Persistent pain or a sudden increase in pain  Nausea with vomiting that does not ease after a few hours.  Abdominal pain or bloating  Fainting  You still have intolerable pain an hour after taking medicine. The medicine may not be strong enough.   You feel too sleepy, dizzy, or groggy. The medicine may be too strong.   You have side effects such as nausea or vomiting, or skin changes such as rash, itching, or hives. Your healthcare provider may suggest other medicines to control side effects.  Rash, itching, or hives may mean you have an allergic reaction. Report this right away. If you have " trouble breathing or facial swelling, call 911 right away.    Coping with pain    *Pain after surgery is normal and expected*    If you have pain after surgery, pain medicine will help you feel better. Take it as told, before pain becomes severe. Also, ask your healthcare provider or pharmacist about other ways to control pain. This might be with heat, ice, or relaxation. And follow any other instructions your surgeon or nurse gives you.    Tips for taking pain medicine    To get the best relief possible, remember these points:    Pain medicines can upset your stomach. Taking them with a little food may help.  Most pain relievers taken by mouth need at least 20 to 30 minutes to start to work.  Don't wait till your pain becomes severe before you take your medicine. Try to time your medicine so that you can take it before starting an activity. This might be before you get dressed, go for a walk, or sit down for dinner.  Constipation is a common side effect of pain medicines. You can take medicines such as laxatives (Miralax) or stool softeners to help ease constipation. Drinking lots of fluids and eating foods such as fruits and vegetables that are high in fiber can also help.  Drinking alcohol and taking pain medicine can cause dizziness and slow your breathing. It can even be deadly. Don't drink alcohol while taking pain medicine.  Pain medicine can make you react more slowly to things. Don't drive or run machinery while taking pain medicine.  Your healthcare provider may tell you to take acetaminophen to help ease your pain. Ask him or her how much you are supposed to take each day. Acetaminophen or other pain relievers may interact with your prescription medicines or other over-the-counter (OTC) medicines. Some prescription medicines have acetaminophen and other ingredients. Using both prescription and OTC acetaminophen for pain can cause you to overdose. Read the labels on your OTC medicines with care. This will  help you to clearly know the list of ingredients, how much to take, and any warnings. It may also help you not take too much acetaminophen. If you have questions or don't understand the information, ask your pharmacist or healthcare provider to explain it to you before you take the OTC medicine.    Discharge Instructions: After Your Surgery, regarding Anesthesia    You ve just had surgery. During surgery, you were given medicine called anesthesia to keep you relaxed and free of pain. After surgery, you may have some pain or nausea. This is common. Here are some tips for feeling better and getting well after surgery.    Going home    Your healthcare provider will show you how to take care of yourself when you go home. He or she will also answer your questions. Have an adult family member or friend drive you home. For the first 24 hours after your surgery:    Don't drive or use heavy equipment.  Don't make important decisions or sign legal papers.  Don't drink alcohol.  Have an adult stay with you for the next 24 hours. He or she can watch for problems and help keep you safe.  Be sure to go to all follow-up visits with your healthcare provider. And rest after your surgery for as long as your healthcare provider tells you to.    Managing nausea    Some people have an upset stomach after surgery. This is often because of anesthesia, pain, or pain medicine, or the stress of surgery. These tips will help you handle nausea and eat healthy foods as you get better. If you were on a special food plan before surgery, ask your healthcare provider if you should follow it while you get better. These tips may help:    Don't push yourself to eat. Your body will tell you when to eat and how much.  Start off with clear liquids and soup. They are easier to digest.  Next try semi-solid foods, such as mashed potatoes, applesauce, and gelatin, as you feel ready.  Slowly move to solid foods. Don t eat fatty, rich, or spicy foods at  first.  Don't force yourself to have 3 large meals a day. Instead eat smaller amounts more often.  Take pain medicines with a small amount of solid food, such as crackers or toast, to prevent nausea.    If you have obstructive sleep apnea    You were given anesthesia medicine during surgery to keep you comfortable and free of pain. After surgery, you may have more apnea spells because of this medicine and other medicines you were given. The spells may last longer than usual.   At home:    Keep using the continuous positive airway pressure (CPAP) device when you sleep. Unless your healthcare provider tells you not to, use it when you sleep, day or night. CPAP is a common device used to treat obstructive sleep apnea.  Talk with your provider before taking any pain medicine, muscle relaxants, or sedatives. Your provider will tell you about the possible dangers of taking these medicines.

## 2023-11-05 LAB — BACTERIA UR CULT: NO GROWTH

## 2023-11-06 ENCOUNTER — APPOINTMENT (OUTPATIENT)
Dept: INTERPRETER SERVICES | Facility: CLINIC | Age: 58
End: 2023-11-06
Payer: COMMERCIAL

## 2023-11-07 ENCOUNTER — OFFICE VISIT (OUTPATIENT)
Dept: INTERNAL MEDICINE | Facility: CLINIC | Age: 58
End: 2023-11-07
Payer: COMMERCIAL

## 2023-11-07 VITALS
BODY MASS INDEX: 34.39 KG/M2 | OXYGEN SATURATION: 96 % | RESPIRATION RATE: 16 BRPM | TEMPERATURE: 97.5 F | HEART RATE: 82 BPM | WEIGHT: 194.1 LBS | DIASTOLIC BLOOD PRESSURE: 87 MMHG | HEIGHT: 63 IN | SYSTOLIC BLOOD PRESSURE: 124 MMHG

## 2023-11-07 DIAGNOSIS — I10 ESSENTIAL HYPERTENSION: ICD-10-CM

## 2023-11-07 DIAGNOSIS — N20.0 CALCULUS OF KIDNEY: Primary | ICD-10-CM

## 2023-11-07 DIAGNOSIS — E79.0 HYPERURICEMIA: ICD-10-CM

## 2023-11-07 DIAGNOSIS — N17.9 ACUTE RENAL FAILURE, UNSPECIFIED ACUTE RENAL FAILURE TYPE (H): ICD-10-CM

## 2023-11-07 LAB
ANION GAP SERPL CALCULATED.3IONS-SCNC: 15 MMOL/L (ref 7–15)
BUN SERPL-MCNC: 43.9 MG/DL (ref 8–23)
CALCIUM SERPL-MCNC: 9.8 MG/DL (ref 8.8–10.2)
CHLORIDE SERPL-SCNC: 94 MMOL/L (ref 98–107)
CREAT SERPL-MCNC: 2.22 MG/DL (ref 0.67–1.17)
DEPRECATED HCO3 PLAS-SCNC: 25 MMOL/L (ref 22–29)
EGFRCR SERPLBLD CKD-EPI 2021: 33 ML/MIN/1.73M2
GLUCOSE SERPL-MCNC: 280 MG/DL (ref 70–99)
POTASSIUM SERPL-SCNC: 3.3 MMOL/L (ref 3.4–5.3)
SODIUM SERPL-SCNC: 134 MMOL/L (ref 135–145)

## 2023-11-07 PROCEDURE — 36415 COLL VENOUS BLD VENIPUNCTURE: CPT | Performed by: INTERNAL MEDICINE

## 2023-11-07 PROCEDURE — 80048 BASIC METABOLIC PNL TOTAL CA: CPT | Performed by: INTERNAL MEDICINE

## 2023-11-07 PROCEDURE — 99213 OFFICE O/P EST LOW 20 MIN: CPT | Performed by: INTERNAL MEDICINE

## 2023-11-07 RX ORDER — ALLOPURINOL 100 MG/1
100 TABLET ORAL DAILY
Qty: 90 TABLET | Refills: 3 | Status: SHIPPED | OUTPATIENT
Start: 2023-11-07

## 2023-11-07 NOTE — PROGRESS NOTES
Assessment & Plan     Nephrolithiasis  Has stent in place still has some gross hematuria he has no pain urine culture was negative for infection.  It we will check his creatinine that had risen up to 1.9 to see if he can pressure and has helped.  He is scheduled for repeat ultrasound by urology and has a follow-up visit in place to remove the stent.  Previous stone analysis showed calcium oxalate stone he is getting another stone analysis done as well.  - Basic metabolic panel  (Ca, Cl, CO2, Creat, Gluc, K, Na, BUN); Future  - Basic metabolic panel  (Ca, Cl, CO2, Creat, Gluc, K, Na, BUN)    Essential hypertension  Blood pressure is dramatically better atenolol.  We will check potassium and creatinine today.  Did tell him he has to continue this indefinitely  - Basic metabolic panel  (Ca, Cl, CO2, Creat, Gluc, K, Na, BUN); Future  - Basic metabolic panel  (Ca, Cl, CO2, Creat, Gluc, K, Na, BUN)    Hyperuricemia  Has hyperuricemia he describes symptoms that are suggestive of gout toe pain and wrist and knuckle pain flareups that involves redness and tenderness.  Currently asymptomatic.  He would be a candidate for allopurinol low-dose if kidney test is better.  I did tell him if it is better he can take the allopurinol he has seen the dietitian and talked about diet changes for his lipids and will discuss with his PCP about whether to start statin      - allopurinol (ZYLOPRIM) 100 MG tablet; Take 1 tablet (100 mg) by mouth daily                 July Potter MD  [facility]    Subjective   Tyrone is a 58 year old, presenting for the following health issues:had hydronephrosis kidney had cystoscopy urethroscopy with stent insertion left side is getting stent removed on Friday  Recheck Medication and Follow Up (Pt is here for 2 week follow up. No concerns)      11/7/2023    11:12 AM   Additional Questions   Roomed by miriam         11/7/2023    11:12 AM   Patient Reported Additional  "Medications   Patient reports taking the following new medications none       History of Present Illness       Reason for visit:  Blood in urine and flank pain    He eats 2-3 servings of fruits and vegetables daily.He consumes 0 sweetened beverage(s) daily.He exercises with enough effort to increase his heart rate 10 to 19 minutes per day.  He exercises with enough effort to increase his heart rate 3 or less days per week.   He is taking medications regularly.                 Review of Systems         Objective    /87 (BP Location: Right arm, Patient Position: Sitting, Cuff Size: Adult Regular)   Pulse 82   Temp 97.5  F (36.4  C) (Tympanic)   Resp 16   Ht 1.6 m (5' 3\")   Wt 88 kg (194 lb 1.6 oz)   SpO2 96%   BMI 34.38 kg/m    Body mass index is 34.38 kg/m .  Physical Exam                         "

## 2023-11-07 NOTE — PATIENT INSTRUCTIONS
Continue taking blood pressure indefinitely   You may need a medication for cholesterol and uric acid   Will send in the gout medication if the kidney test is better

## 2023-11-08 LAB
APPEARANCE STONE: NORMAL
COMPN STONE: NORMAL
SPECIMEN WT: 31 MG

## 2023-11-10 ENCOUNTER — ALLIED HEALTH/NURSE VISIT (OUTPATIENT)
Dept: UROLOGY | Facility: CLINIC | Age: 58
End: 2023-11-10
Payer: COMMERCIAL

## 2023-11-10 DIAGNOSIS — N20.1 CALCULUS OF URETER: Primary | ICD-10-CM

## 2023-11-10 PROCEDURE — 99207 PR NO CHARGE NURSE ONLY: CPT

## 2023-11-10 RX ORDER — ATENOLOL 50 MG/1
50 TABLET ORAL DAILY
Qty: 90 TABLET | Refills: 3 | Status: SHIPPED | OUTPATIENT
Start: 2023-11-10

## 2023-11-10 NOTE — PROGRESS NOTES
Patient here to have stent on a string removed by nurse.  Stent was removed intact without issue and patient tolerated procedure.  Work note given and discussed possible after pain and interventions.  Ann Wheeler RN

## 2023-11-10 NOTE — RESULT ENCOUNTER NOTE
You will need [facility] interpretor for this result message    Patients labs still show kidney dysfunction despite the stent   It could be because he is still dehydrated   Recommend changing his blood pressure pill by switching to plain atenolol . Currently he is seeing a urologist but he needs to see a nephrologist which is a medical kidney specialist about his abnormal kidney  test   Recommend repeat BMP next week Monday or Tuesday

## 2023-11-17 ENCOUNTER — LAB (OUTPATIENT)
Dept: LAB | Facility: CLINIC | Age: 58
End: 2023-11-17
Payer: COMMERCIAL

## 2023-11-17 DIAGNOSIS — I10 ESSENTIAL HYPERTENSION: ICD-10-CM

## 2023-11-17 LAB
ANION GAP SERPL CALCULATED.3IONS-SCNC: 11 MMOL/L (ref 7–15)
BUN SERPL-MCNC: 29.2 MG/DL (ref 8–23)
CALCIUM SERPL-MCNC: 9.3 MG/DL (ref 8.8–10.2)
CHLORIDE SERPL-SCNC: 104 MMOL/L (ref 98–107)
CREAT SERPL-MCNC: 1.66 MG/DL (ref 0.67–1.17)
DEPRECATED HCO3 PLAS-SCNC: 24 MMOL/L (ref 22–29)
EGFRCR SERPLBLD CKD-EPI 2021: 47 ML/MIN/1.73M2
GLUCOSE SERPL-MCNC: 127 MG/DL (ref 70–99)
POTASSIUM SERPL-SCNC: 4.2 MMOL/L (ref 3.4–5.3)
SODIUM SERPL-SCNC: 139 MMOL/L (ref 135–145)

## 2023-11-17 PROCEDURE — 80048 BASIC METABOLIC PNL TOTAL CA: CPT

## 2023-11-17 PROCEDURE — 36415 COLL VENOUS BLD VENIPUNCTURE: CPT

## 2023-11-21 ENCOUNTER — APPOINTMENT (OUTPATIENT)
Dept: INTERPRETER SERVICES | Facility: CLINIC | Age: 58
End: 2023-11-21
Payer: COMMERCIAL

## 2023-12-11 ENCOUNTER — HOSPITAL ENCOUNTER (OUTPATIENT)
Dept: ULTRASOUND IMAGING | Facility: HOSPITAL | Age: 58
Discharge: HOME | End: 2023-12-11
Attending: UROLOGY | Admitting: UROLOGY
Payer: COMMERCIAL

## 2023-12-11 DIAGNOSIS — N20.1 CALCULUS OF PROXIMAL LEFT URETER: ICD-10-CM

## 2023-12-11 PROCEDURE — 76770 US EXAM ABDO BACK WALL COMP: CPT

## 2023-12-19 ENCOUNTER — VIRTUAL VISIT (OUTPATIENT)
Dept: UROLOGY | Facility: CLINIC | Age: 58
End: 2023-12-19
Payer: COMMERCIAL

## 2023-12-19 ENCOUNTER — TELEPHONE (OUTPATIENT)
Dept: UROLOGY | Facility: CLINIC | Age: 58
End: 2023-12-19

## 2023-12-19 VITALS — HEIGHT: 63 IN | BODY MASS INDEX: 34.38 KG/M2

## 2023-12-19 DIAGNOSIS — N20.0 URIC ACID NEPHROLITHIASIS: ICD-10-CM

## 2023-12-19 DIAGNOSIS — R94.4 DECREASED GFR: Primary | ICD-10-CM

## 2023-12-19 DIAGNOSIS — N20.0 NEPHROLITHIASIS: ICD-10-CM

## 2023-12-19 PROCEDURE — 99214 OFFICE O/P EST MOD 30 MIN: CPT | Mod: VID | Performed by: NURSE PRACTITIONER

## 2023-12-19 ASSESSMENT — PAIN SCALES - GENERAL: PAINLEVEL: NO PAIN (0)

## 2023-12-19 NOTE — NURSING NOTE
Is the patient currently in the state of [place]? YES    Visit mode:VIDEO    If the visit is dropped, the patient can be reconnected by: VIDEO VISIT: Text to cell phone:   Telephone Information:   Mobile [phone]       Will anyone else be joining the visit? NO  (If patient encounters technical issues they should call [phone]    How would you like to obtain your AVS? Mail a copy    Are changes needed to the allergy or medication list? No    Pt takes 50 mg of atenolol prn.     Reason for visit: RECHCOLT MATHEW

## 2023-12-19 NOTE — PROGRESS NOTES
Urology Video Office Visit    Video-Visit Details    Type of service:  Video Visit    Video Start Time: 0913    Video End Time: 0934    Originating Location (pt. Location): Home    Distant Location (provider location):  Off-site     Platform used for Video Visit: abcdexperts           Assessment and Plan:     Assessment: 58 year old male with history of uric acid and CaOx nephrolithiasis     Plan:  -Reviewed renal US with patient. No noted hydronephrosis. Noted nonobstructing right renal stone which is seen on CT Scan on 10/26/23.   -The patient and I discussed the diagnosis and natural history of urolithiasis. Recommend 24 hour urine study for further evaluation for risks of stones given he has made both CaOx and Uric Acid stones. Pt amenable to plan of care.   -Recommend to stay well-hydrated at this time with 90-120oz of water.   -Will place referral to nephrology for further evaluation of elevated sCr and decreased GFR despite definitive stone surgery.   -RTC in 6-8 weeks to review 24 hour urine study.       Brooke Davis CNP  Department of Urology  December 19, 2023    I spent a total of 35 minutes spent on the date of the encounter doing chart review, history and exam, documentation, and further activities as noted above.          Chief Complaint:   Post-op follow up          History of Present Illness:   Tyrone Lopez is a pleasant 58 year old male who presents for post-op follow up.    Mr. Lopez had a left URS/LL on 11/03/23 due to a left obstructing ureteral stone. Stent was removed in clinic on 11/10/23.    Stone Analysis: Uric Acid  Prior stone analysis were CaOx    He is doing well at this time. Denies any s/s of kidney stones. Denies any f/c/n/v, hematuria, or dysuria.     Renal US  performed on 12/11/23 (images personally reviewed) revealed bilateral symmetrical kidneys without hydronephrosis. Noted right simple renal cyst.    Medications: He was on allopurinol for hyperuricemia which he has stopped at this  time.     He was recently seen by his PCP who did recommend following up with nephrology for concerns of kidney disease.              Past Medical History:     Past Medical History:   Diagnosis Date    Hypertension     Uncomplicated asthma             Past Surgical History:     Past Surgical History:   Procedure Laterality Date    APPENDECTOMY      COMBINED CYSTOSCOPY, RETROGRADES, URETEROSCOPY, LASER HOLMIUM LITHOTRIPSY URETER(S), INSERT STENT Left 11/3/2023    Procedure: Cystoscopy, Left Ureteroscopy, Left Retrograde Pyelogram, Left Laser Lithotripsy, Stone Basket Extraction, Left Ureteral Stent Placement;  Surgeon: Rory oNonan MD;  Location: [facility]            Medications     Current Outpatient Medications   Medication    albuterol (PROAIR HFA/PROVENTIL HFA/VENTOLIN HFA) 108 (90 Base) MCG/ACT inhaler    allopurinol (ZYLOPRIM) 100 MG tablet    atenolol (TENORMIN) 50 MG tablet    atenolol-chlorthalidone (TENORETIC) 50-25 MG tablet    budesonide-formoterol (SYMBICORT) 160-4.5 MCG/ACT Inhaler    cephALEXin (KEFLEX) 500 MG capsule    cetirizine (ZYRTEC) 10 MG tablet    tamsulosin (FLOMAX) 0.4 MG capsule     No current facility-administered medications for this visit.            Family History:   No family history on file.         Social History:     Social History     Socioeconomic History    Marital status:      Spouse name: Not on file    Number of children: Not on file    Years of education: Not on file    Highest education level: Not on file   Occupational History    Not on file   Tobacco Use    Smoking status: Never    Smokeless tobacco: Never   Vaping Use    Vaping Use: Never used   Substance and Sexual Activity    Alcohol use: No    Drug use: No    Sexual activity: Not on file   Other Topics Concern    Not on file   Social History Narrative    Not on file     Social Determinants of Health     Financial Resource Strain: Low Risk  (10/26/2023)    Financial Resource Strain     Within the  past 12 months, have you or your family members you live with been unable to get utilities (heat, electricity) when it was really needed?: No   Food Insecurity: Low Risk  (10/26/2023)    Food Insecurity     Within the past 12 months, did you worry that your food would run out before you got money to buy more?: No     Within the past 12 months, did the food you bought just not last and you didn t have money to get more?: No   Transportation Needs: Low Risk  (10/26/2023)    Transportation Needs     Within the past 12 months, has lack of transportation kept you from medical appointments, getting your medicines, non-medical meetings or appointments, work, or from getting things that you need?: No   Physical Activity: Not on file   Stress: Not on file   Social Connections: Not on file   Interpersonal Safety: Low Risk  (10/26/2023)    Interpersonal Safety     Do you feel physically and emotionally safe where you currently live?: Yes     Within the past 12 months, have you been hit, slapped, kicked or otherwise physically hurt by someone?: No     Within the past 12 months, have you been humiliated or emotionally abused in other ways by your partner or ex-partner?: No   Housing Stability: Low Risk  (10/26/2023)    Housing Stability     Do you have housing? : Yes     Are you worried about losing your housing?: No            Allergies:   Patient has no known allergies.         Review of Systems:  From intake questionnaire   Negative 14 system review except as noted on HPI, nurse's note.         Physical Exam:   General Appearance: Well groomed, hygenic  Eyes: No redness, discharge  Respiratory: No cough, no respiratory distress or labored breathing  Musculoskeletal: Grossly normal, full range of motion in upper extremities, no gross deficits  Skin: No discoloration or apparent rashes  Neurologic - No tremors  Psychiatric - Alert and oriented  The rest of a comprehensive physical examination is deferred due to video visit  restrictions        Labs:    I personally reviewed all applicable laboratory data and went over findings with patient  Significant for:    CBC RESULTS:  Recent Labs   Lab Test 10/26/23  1251   WBC 14.1*   HGB 15.5           BMP RESULTS:  Recent Labs   Lab Test 11/17/23  0826 11/07/23  1138 10/30/23  1211 10/26/23  1251    134* 141 141   POTASSIUM 4.2 3.3* 4.2 4.7   CHLORIDE 104 94* 103 104   CO2 24 25 24 19*   ANIONGAP 11 15 14 18*   * 280* 115* 152*   BUN 29.2* 43.9* 40.9* 18.9   CR 1.66* 2.22* 1.96* 1.42*   GFRESTIMATED 47* 33* 38* 57*   GASTON 9.3 9.8 9.8 9.9       UA RESULTS:   Recent Labs   Lab Test 10/26/23  1251   SG 1.025   URINEPH 5.5   NITRITE Negative   RBCU 25-50*   WBCU 0-5       CALCIUM RESULTS  Lab Results   Component Value Date    GASTON 9.3 11/17/2023    GASTON 9.8 11/07/2023    GASTON 9.8 10/30/2023           Imaging:    I personally reviewed all applicable imaging and went over the below findings with patient.    Results for orders placed or performed during the hospital encounter of 12/11/23   US Renal Complete Non-Vascular    Narrative    EXAM: US RENAL COMPLETE NON-VASCULAR  LOCATION: [facility]  DATE: 12/11/2023    INDICATION: Left laser lithotripsy and stone extraction 11/03/2023  COMPARISON: CT AP 10/26/2023 and older studies  TECHNIQUE: Routine Bilateral Renal and Bladder Ultrasound.    FINDINGS:    RIGHT KIDNEY: 11.7 x 6.2 x 5 cm. No hydronephrosis. There is a 1.7 cm simple cysts laterally in the midportion of the right kidney.     LEFT KIDNEY: 10.6 x 5.8 x 4.2 cm. No hydronephrosis. There is a 5 mm echogenic focus in the lower pole calyx on the left.     BLADDER: Normal.      Impression    IMPRESSION:  1.  The nonobstructing 5 mm calculus in the lower pole calyx on the left.   2.  CT can better assess residual stone burden on the left.  3.  Known punctate calculus in the lower pole of the right kidney is not seen by ultrasound.  4.  No evidence of  hydronephrosis on either side.  5.  Incidental simple cyst in the right kidney noted.

## 2024-01-08 DIAGNOSIS — J45.30 MILD PERSISTENT ASTHMA, UNSPECIFIED WHETHER COMPLICATED: ICD-10-CM

## 2024-01-09 RX ORDER — BUDESONIDE AND FORMOTEROL FUMARATE DIHYDRATE 160; 4.5 UG/1; UG/1
1 AEROSOL RESPIRATORY (INHALATION) 2 TIMES DAILY
Qty: 10.2 G | Refills: 3 | Status: SHIPPED | OUTPATIENT
Start: 2024-01-09

## 2024-03-07 ENCOUNTER — OFFICE VISIT (OUTPATIENT)
Dept: INTERNAL MEDICINE | Facility: CLINIC | Age: 59
End: 2024-03-07
Payer: COMMERCIAL

## 2024-03-07 VITALS
BODY MASS INDEX: 34.12 KG/M2 | HEIGHT: 65 IN | RESPIRATION RATE: 16 BRPM | WEIGHT: 204.8 LBS | DIASTOLIC BLOOD PRESSURE: 83 MMHG | OXYGEN SATURATION: 95 % | SYSTOLIC BLOOD PRESSURE: 122 MMHG | TEMPERATURE: 97.6 F | HEART RATE: 65 BPM

## 2024-03-07 DIAGNOSIS — E66.01 CLASS 2 SEVERE OBESITY WITH SERIOUS COMORBIDITY IN ADULT, UNSPECIFIED BMI, UNSPECIFIED OBESITY TYPE (H): ICD-10-CM

## 2024-03-07 DIAGNOSIS — E66.812 CLASS 2 SEVERE OBESITY WITH SERIOUS COMORBIDITY IN ADULT, UNSPECIFIED BMI, UNSPECIFIED OBESITY TYPE (H): ICD-10-CM

## 2024-03-07 DIAGNOSIS — R79.89 ELEVATED SERUM CREATININE: ICD-10-CM

## 2024-03-07 DIAGNOSIS — I10 PRIMARY HYPERTENSION: Primary | ICD-10-CM

## 2024-03-07 DIAGNOSIS — R73.09 ELEVATED GLUCOSE: ICD-10-CM

## 2024-03-07 LAB — HBA1C MFR BLD: 6.5 % (ref 0–5.6)

## 2024-03-07 PROCEDURE — 36415 COLL VENOUS BLD VENIPUNCTURE: CPT | Performed by: INTERNAL MEDICINE

## 2024-03-07 PROCEDURE — 83036 HEMOGLOBIN GLYCOSYLATED A1C: CPT | Performed by: INTERNAL MEDICINE

## 2024-03-07 PROCEDURE — 80048 BASIC METABOLIC PNL TOTAL CA: CPT | Performed by: INTERNAL MEDICINE

## 2024-03-07 PROCEDURE — 99214 OFFICE O/P EST MOD 30 MIN: CPT | Performed by: INTERNAL MEDICINE

## 2024-03-07 ASSESSMENT — ASTHMA QUESTIONNAIRES
QUESTION_1 LAST FOUR WEEKS HOW MUCH OF THE TIME DID YOUR ASTHMA KEEP YOU FROM GETTING AS MUCH DONE AT WORK, SCHOOL OR AT HOME: A LITTLE OF THE TIME
QUESTION_5 LAST FOUR WEEKS HOW WOULD YOU RATE YOUR ASTHMA CONTROL: SOMEWHAT CONTROLLED
QUESTION_3 LAST FOUR WEEKS HOW OFTEN DID YOUR ASTHMA SYMPTOMS (WHEEZING, COUGHING, SHORTNESS OF BREATH, CHEST TIGHTNESS OR PAIN) WAKE YOU UP AT NIGHT OR EARLIER THAN USUAL IN THE MORNING: TWO OR THREE NIGHTS A WEEK
ACT_TOTALSCORE: 15
ACT_TOTALSCORE: 15
QUESTION_2 LAST FOUR WEEKS HOW OFTEN HAVE YOU HAD SHORTNESS OF BREATH: ONCE OR TWICE A WEEK
QUESTION_4 LAST FOUR WEEKS HOW OFTEN HAVE YOU USED YOUR RESCUE INHALER OR NEBULIZER MEDICATION (SUCH AS ALBUTEROL): ONE OR TWO TIMES PER DAY

## 2024-03-07 NOTE — PROGRESS NOTES
"  Assessment & Plan     (I10) Primary hypertension  (primary encounter diagnosis)  Comment: controlled  Plan: Will plan to continue on previous medication without changes     (R79.89) Elevated serum creatinine  Comment: CARLOTTA noted when ureteral stone was found. Had removal by urology, creatinine was slow to return. Renal referral placed though he never made contact with them. Last creatinine in 11/2023 did show some improvement though not at baseline. Otherwise feels well.  Plan: Basic metabolic panel  (Ca, Cl, CO2, Creat,         Gluc, K, Na, BUN)        Recheck, has been hydrating. If near normal/normal, then okay not to see nephrology, discussed.    (E66.01) Class 2 severe obesity with serious comorbidity in adult, unspecified BMI, unspecified obesity type (H)  Comment: ongoing work with this, minimal physical activity  Plan: encouragement to work on this    (R73.09) Elevated glucose  Comment: noted over the last year  Plan: Hemoglobin A1c        A1c today.    Briefly discussed a statin. Would recommend it though awaiting renal function results. May be diabetic as well. Will contact him once results are back to have a follow up with me in the near future depending on results.            BMI  Estimated body mass index is 34.08 kg/m  as calculated from the following:    Height as of this encounter: 1.651 m (5' 5\").    Weight as of this encounter: 92.9 kg (204 lb 12.8 oz).             Baltazar Hansen is a 58 year old, presenting for the following health issues:  OFFICE VISIT  and Recheck Medication (Pt is here for office visit for 6 month follow up )      3/7/2024     8:25 AM   Additional Questions   Roomed by makenna         3/7/2024     8:25 AM   Patient Reported Additional Medications   Patient reports taking the following new medications no     History of Present Illness       Reason for visit:  6 month follow up    He eats 2-3 servings of fruits and vegetables daily.He consumes 0 sweetened beverage(s) " "daily.He exercises with enough effort to increase his heart rate 20 to 29 minutes per day.  He exercises with enough effort to increase his heart rate 3 or less days per week.   He is taking medications regularly.                     Objective    /83 (BP Location: Left arm, Patient Position: Sitting, Cuff Size: Adult Regular)   Pulse 65   Temp 97.6  F (36.4  C) (Oral)   Resp 16   Ht 1.651 m (5' 5\")   Wt 92.9 kg (204 lb 12.8 oz)   SpO2 95%   BMI 34.08 kg/m    Body mass index is 34.08 kg/m .  Physical Exam               Signed Electronically by: Oleg Bañuelos MD    "

## 2024-03-08 LAB
ANION GAP SERPL CALCULATED.3IONS-SCNC: 8 MMOL/L (ref 7–15)
BUN SERPL-MCNC: 25.7 MG/DL (ref 8–23)
CALCIUM SERPL-MCNC: 9.5 MG/DL (ref 8.8–10.2)
CHLORIDE SERPL-SCNC: 106 MMOL/L (ref 98–107)
CREAT SERPL-MCNC: 1.39 MG/DL (ref 0.67–1.17)
DEPRECATED HCO3 PLAS-SCNC: 24 MMOL/L (ref 22–29)
EGFRCR SERPLBLD CKD-EPI 2021: 58 ML/MIN/1.73M2
GLUCOSE SERPL-MCNC: 121 MG/DL (ref 70–99)
POTASSIUM SERPL-SCNC: 4.6 MMOL/L (ref 3.4–5.3)
SODIUM SERPL-SCNC: 138 MMOL/L (ref 135–145)

## 2024-05-16 ENCOUNTER — OFFICE VISIT (OUTPATIENT)
Dept: INTERNAL MEDICINE | Facility: CLINIC | Age: 59
End: 2024-05-16
Payer: COMMERCIAL

## 2024-05-16 VITALS
HEIGHT: 65 IN | HEART RATE: 81 BPM | OXYGEN SATURATION: 98 % | SYSTOLIC BLOOD PRESSURE: 132 MMHG | DIASTOLIC BLOOD PRESSURE: 89 MMHG | TEMPERATURE: 97.9 F | WEIGHT: 200 LBS | RESPIRATION RATE: 16 BRPM | BODY MASS INDEX: 33.32 KG/M2

## 2024-05-16 DIAGNOSIS — E11.22 TYPE 2 DIABETES MELLITUS WITH STAGE 3A CHRONIC KIDNEY DISEASE, WITHOUT LONG-TERM CURRENT USE OF INSULIN (H): Primary | ICD-10-CM

## 2024-05-16 DIAGNOSIS — E78.2 MIXED HYPERLIPIDEMIA: ICD-10-CM

## 2024-05-16 DIAGNOSIS — I10 PRIMARY HYPERTENSION: ICD-10-CM

## 2024-05-16 DIAGNOSIS — N18.31 TYPE 2 DIABETES MELLITUS WITH STAGE 3A CHRONIC KIDNEY DISEASE, WITHOUT LONG-TERM CURRENT USE OF INSULIN (H): Primary | ICD-10-CM

## 2024-05-16 PROCEDURE — 99214 OFFICE O/P EST MOD 30 MIN: CPT | Performed by: INTERNAL MEDICINE

## 2024-05-16 PROCEDURE — 36415 COLL VENOUS BLD VENIPUNCTURE: CPT | Performed by: INTERNAL MEDICINE

## 2024-05-16 PROCEDURE — 80048 BASIC METABOLIC PNL TOTAL CA: CPT | Performed by: INTERNAL MEDICINE

## 2024-05-16 PROCEDURE — G2211 COMPLEX E/M VISIT ADD ON: HCPCS | Performed by: INTERNAL MEDICINE

## 2024-05-16 PROCEDURE — 82570 ASSAY OF URINE CREATININE: CPT | Performed by: INTERNAL MEDICINE

## 2024-05-16 PROCEDURE — 82043 UR ALBUMIN QUANTITATIVE: CPT | Performed by: INTERNAL MEDICINE

## 2024-05-16 RX ORDER — ATORVASTATIN CALCIUM 20 MG/1
20 TABLET, FILM COATED ORAL DAILY
Qty: 90 TABLET | Refills: 3 | Status: SHIPPED | OUTPATIENT
Start: 2024-05-16

## 2024-05-16 ASSESSMENT — ASTHMA QUESTIONNAIRES
QUESTION_3 LAST FOUR WEEKS HOW OFTEN DID YOUR ASTHMA SYMPTOMS (WHEEZING, COUGHING, SHORTNESS OF BREATH, CHEST TIGHTNESS OR PAIN) WAKE YOU UP AT NIGHT OR EARLIER THAN USUAL IN THE MORNING: TWO OR THREE NIGHTS A WEEK
QUESTION_2 LAST FOUR WEEKS HOW OFTEN HAVE YOU HAD SHORTNESS OF BREATH: NOT AT ALL
ACT_TOTALSCORE: 16
QUESTION_5 LAST FOUR WEEKS HOW WOULD YOU RATE YOUR ASTHMA CONTROL: POORLY CONTROLLED
QUESTION_1 LAST FOUR WEEKS HOW MUCH OF THE TIME DID YOUR ASTHMA KEEP YOU FROM GETTING AS MUCH DONE AT WORK, SCHOOL OR AT HOME: NONE OF THE TIME
ACT_TOTALSCORE: 16
QUESTION_4 LAST FOUR WEEKS HOW OFTEN HAVE YOU USED YOUR RESCUE INHALER OR NEBULIZER MEDICATION (SUCH AS ALBUTEROL): ONE OR TWO TIMES PER DAY

## 2024-05-16 ASSESSMENT — PAIN SCALES - GENERAL: PAINLEVEL: NO PAIN (0)

## 2024-05-16 NOTE — PROGRESS NOTES
"  Assessment & Plan     (E11.22,  N18.31) Type 2 diabetes mellitus with stage 3a chronic kidney disease, without long-term current use of insulin (H)  (primary encounter diagnosis)  Comment: new onset  Plan: Basic metabolic panel  (Ca, Cl, CO2, Creat,         Gluc, K, Na, BUN), Albumin Random Urine         Quantitative with Creat Ratio, Adult Eye          Referral        Discussed in detail. Plan--eye exam, hbga1c, statin (started) and follow up in 4 months.     (E78.2) Mixed hyperlipidemia  Comment: was not on statin as of yet, new DM diagnosis  Plan: atorvastatin (LIPITOR) 20 MG tablet       Will start 20mg lipitor. Discussed. Recheck levels at next visit.    (I10) Primary hypertension  Comment: reasonable control, will need to watch closely though  Plan: Will plan to continue on previous medication without changes     The longitudinal plan of care for the diagnosis(es)/condition(s) as documented were addressed during this visit. Due to the added complexity in care, I will continue to support Tyrone in the subsequent management and with ongoing continuity of care.           BMI  Estimated body mass index is 33.28 kg/m  as calculated from the following:    Height as of this encounter: 1.651 m (5' 5\").    Weight as of this encounter: 90.7 kg (200 lb).             Subjective   Tyrone is a 58 year old, presenting for the following health issues:  Diabetes (Patient would like to discuss new type 2 diabetes diagnosis. Patient has been struggling with his asthma and has been using Symbicort.)      5/16/2024     9:00 AM   Additional Questions   Roomed by Kim OLMEDO         5/16/2024     9:01 AM   Patient Reported Additional Medications   Patient reports taking the following new medications pateint is using Symbicort     History of Present Illness       Reason for visit:  New diabetes diagnosis  Symptom onset:  More than a month  Symptom intensity:  Mild  Symptom progression:  Improving  Had these symptoms before:  " "No  What makes it worse:  No  What makes it better:  No    He eats 2-3 servings of fruits and vegetables daily.He consumes 0 sweetened beverage(s) daily.He exercises with enough effort to increase his heart rate 10 to 19 minutes per day.  He exercises with enough effort to increase his heart rate 4 days per week.   He is taking medications regularly.                     Objective    BP (!) 140/91 (BP Location: Left arm, Patient Position: Sitting, Cuff Size: Adult Regular)   Pulse 81   Temp 97.9  F (36.6  C) (Tympanic)   Resp 16   Ht 1.651 m (5' 5\")   Wt 90.7 kg (200 lb)   SpO2 98%   BMI 33.28 kg/m    Body mass index is 33.28 kg/m .  Physical Exam               Signed Electronically by: Oleg Bañuelos MD    "

## 2024-05-17 LAB
ANION GAP SERPL CALCULATED.3IONS-SCNC: 11 MMOL/L (ref 7–15)
BUN SERPL-MCNC: 29.8 MG/DL (ref 8–23)
CALCIUM SERPL-MCNC: 9.6 MG/DL (ref 8.8–10.2)
CHLORIDE SERPL-SCNC: 107 MMOL/L (ref 98–107)
CREAT SERPL-MCNC: 1.38 MG/DL (ref 0.67–1.17)
CREAT UR-MCNC: 63.3 MG/DL
DEPRECATED HCO3 PLAS-SCNC: 23 MMOL/L (ref 22–29)
EGFRCR SERPLBLD CKD-EPI 2021: 59 ML/MIN/1.73M2
GLUCOSE SERPL-MCNC: 116 MG/DL (ref 70–99)
MICROALBUMIN UR-MCNC: 19 MG/L
MICROALBUMIN/CREAT UR: 30.02 MG/G CR (ref 0–17)
POTASSIUM SERPL-SCNC: 5 MMOL/L (ref 3.4–5.3)
SODIUM SERPL-SCNC: 141 MMOL/L (ref 135–145)

## 2024-07-03 ENCOUNTER — APPOINTMENT (OUTPATIENT)
Dept: INTERPRETER SERVICES | Facility: CLINIC | Age: 59
End: 2024-07-03
Payer: COMMERCIAL

## 2024-07-21 ENCOUNTER — HEALTH MAINTENANCE LETTER (OUTPATIENT)
Age: 59
End: 2024-07-21

## 2024-09-11 ENCOUNTER — OFFICE VISIT (OUTPATIENT)
Dept: INTERNAL MEDICINE | Facility: CLINIC | Age: 59
End: 2024-09-11
Payer: COMMERCIAL

## 2024-09-11 VITALS
WEIGHT: 205 LBS | DIASTOLIC BLOOD PRESSURE: 82 MMHG | HEART RATE: 60 BPM | BODY MASS INDEX: 34.16 KG/M2 | SYSTOLIC BLOOD PRESSURE: 130 MMHG | TEMPERATURE: 98.4 F | HEIGHT: 65 IN | OXYGEN SATURATION: 97 % | RESPIRATION RATE: 16 BRPM

## 2024-09-11 DIAGNOSIS — N18.31 TYPE 2 DIABETES MELLITUS WITH STAGE 3A CHRONIC KIDNEY DISEASE, WITHOUT LONG-TERM CURRENT USE OF INSULIN (H): Primary | ICD-10-CM

## 2024-09-11 DIAGNOSIS — J45.901 MODERATE ASTHMA WITH EXACERBATION, UNSPECIFIED WHETHER PERSISTENT: ICD-10-CM

## 2024-09-11 DIAGNOSIS — E78.2 MIXED HYPERLIPIDEMIA: ICD-10-CM

## 2024-09-11 DIAGNOSIS — E11.22 TYPE 2 DIABETES MELLITUS WITH STAGE 3A CHRONIC KIDNEY DISEASE, WITHOUT LONG-TERM CURRENT USE OF INSULIN (H): Primary | ICD-10-CM

## 2024-09-11 DIAGNOSIS — I10 PRIMARY HYPERTENSION: ICD-10-CM

## 2024-09-11 LAB
ALT SERPL W P-5'-P-CCNC: 40 U/L (ref 0–70)
CHOLEST SERPL-MCNC: 164 MG/DL
FASTING STATUS PATIENT QL REPORTED: NORMAL
HBA1C MFR BLD: 6.3 % (ref 0–5.6)
HDLC SERPL-MCNC: 52 MG/DL
HGB BLD-MCNC: 14.1 G/DL (ref 13.3–17.7)
LDLC SERPL CALC-MCNC: 91 MG/DL
NONHDLC SERPL-MCNC: 112 MG/DL
TRIGL SERPL-MCNC: 104 MG/DL

## 2024-09-11 PROCEDURE — 90673 RIV3 VACCINE NO PRESERV IM: CPT | Performed by: INTERNAL MEDICINE

## 2024-09-11 PROCEDURE — 84460 ALANINE AMINO (ALT) (SGPT): CPT | Performed by: INTERNAL MEDICINE

## 2024-09-11 PROCEDURE — 83036 HEMOGLOBIN GLYCOSYLATED A1C: CPT | Performed by: INTERNAL MEDICINE

## 2024-09-11 PROCEDURE — 36415 COLL VENOUS BLD VENIPUNCTURE: CPT | Performed by: INTERNAL MEDICINE

## 2024-09-11 PROCEDURE — 99214 OFFICE O/P EST MOD 30 MIN: CPT | Mod: 25 | Performed by: INTERNAL MEDICINE

## 2024-09-11 PROCEDURE — 85018 HEMOGLOBIN: CPT | Performed by: INTERNAL MEDICINE

## 2024-09-11 PROCEDURE — 80061 LIPID PANEL: CPT | Performed by: INTERNAL MEDICINE

## 2024-09-11 PROCEDURE — 90471 IMMUNIZATION ADMIN: CPT | Performed by: INTERNAL MEDICINE

## 2024-09-11 RX ORDER — MONTELUKAST SODIUM 10 MG/1
10 TABLET ORAL AT BEDTIME
Qty: 90 TABLET | Refills: 3 | Status: SHIPPED | OUTPATIENT
Start: 2024-09-11

## 2024-09-11 ASSESSMENT — ASTHMA QUESTIONNAIRES
ACT_TOTALSCORE: 22
QUESTION_1 LAST FOUR WEEKS HOW MUCH OF THE TIME DID YOUR ASTHMA KEEP YOU FROM GETTING AS MUCH DONE AT WORK, SCHOOL OR AT HOME: NONE OF THE TIME
QUESTION_2 LAST FOUR WEEKS HOW OFTEN HAVE YOU HAD SHORTNESS OF BREATH: NOT AT ALL
QUESTION_1 LAST FOUR WEEKS HOW MUCH OF THE TIME DID YOUR ASTHMA KEEP YOU FROM GETTING AS MUCH DONE AT WORK, SCHOOL OR AT HOME: NONE OF THE TIME
QUESTION_5 LAST FOUR WEEKS HOW WOULD YOU RATE YOUR ASTHMA CONTROL: COMPLETELY CONTROLLED
QUESTION_4 LAST FOUR WEEKS HOW OFTEN HAVE YOU USED YOUR RESCUE INHALER OR NEBULIZER MEDICATION (SUCH AS ALBUTEROL): ONE OR TWO TIMES PER DAY
ACT_TOTALSCORE: 22
QUESTION_2 LAST FOUR WEEKS HOW OFTEN HAVE YOU HAD SHORTNESS OF BREATH: NOT AT ALL
QUESTION_5 LAST FOUR WEEKS HOW WOULD YOU RATE YOUR ASTHMA CONTROL: COMPLETELY CONTROLLED
QUESTION_3 LAST FOUR WEEKS HOW OFTEN DID YOUR ASTHMA SYMPTOMS (WHEEZING, COUGHING, SHORTNESS OF BREATH, CHEST TIGHTNESS OR PAIN) WAKE YOU UP AT NIGHT OR EARLIER THAN USUAL IN THE MORNING: NOT AT ALL
QUESTION_4 LAST FOUR WEEKS HOW OFTEN HAVE YOU USED YOUR RESCUE INHALER OR NEBULIZER MEDICATION (SUCH AS ALBUTEROL): ONE OR TWO TIMES PER DAY
ACT_TOTALSCORE: 22
QUESTION_3 LAST FOUR WEEKS HOW OFTEN DID YOUR ASTHMA SYMPTOMS (WHEEZING, COUGHING, SHORTNESS OF BREATH, CHEST TIGHTNESS OR PAIN) WAKE YOU UP AT NIGHT OR EARLIER THAN USUAL IN THE MORNING: NOT AT ALL

## 2024-09-11 NOTE — PROGRESS NOTES
"  Assessment & Plan     (E11.22,  N18.31) Type 2 diabetes mellitus with stage 3a chronic kidney disease, without long-term current use of insulin (H)  (primary encounter diagnosis)  Comment: controlled, recent diagnosis  Plan: HEMOGLOBIN A1C, Hemoglobin        Recheck today. Still needs eye exam. He does not remember getting a call from the eye referral team though there is documentation of outreach and letter sent. I gave him their phone number to call--he will need  to help with this. Asked him to get this done prior to the end of the year.    (I10) Primary hypertension  Comment: controlled  Plan: Will plan to continue on previous medication without changes     (E78.2) Mixed hyperlipidemia  Comment: on statin, tolerating well   Plan: Lipid panel reflex to direct LDL Non-fasting        Recheck level. ALT ordered as well    (J45.901) Moderate asthma with exacerbation, unspecified whether persistent  Comment: stable overall  Plan: montelukast (SINGULAIR) 10 MG tablet        Uses Singular intermittently. Renewed    See me in 6 months for diabetes recheck          BMI  Estimated body mass index is 34.11 kg/m  as calculated from the following:    Height as of this encounter: 1.651 m (5' 5\").    Weight as of this encounter: 93 kg (205 lb).             Baltazar Hansen is a 59 year old, presenting for the following health issues:  Recheck Medication (Fasting med check)      9/11/2024    10:38 AM   Additional Questions   Roomed by Mamie Hart   Accompanied by none         9/11/2024   Declines Weight   Did patient decline having their weight taken? Yes        History of Present Illness       Hyperlipidemia:  He presents for follow up of hyperlipidemia.   He is taking medication to lower cholesterol. He is not having myalgia or other side effects to statin medications.    Hypertension: He presents for follow up of hypertension.  He does not check blood pressure  regularly outside of the clinic. Outpatient blood " "pressures have not been over 140/90. He follows a low salt diet.     He eats 0-1 servings of fruits and vegetables daily.He consumes 0 sweetened beverage(s) daily.He exercises with enough effort to increase his heart rate 9 or less minutes per day.  He exercises with enough effort to increase his heart rate 3 or less days per week.   He is taking medications regularly.                     Objective    /82 (BP Location: Left arm, Patient Position: Sitting, Cuff Size: Adult Regular)   Pulse 60   Temp 98.4  F (36.9  C) (Oral)   Resp 16   Ht 1.651 m (5' 5\")   Wt 93 kg (205 lb)   SpO2 97%   BMI 34.11 kg/m    Body mass index is 34.11 kg/m .  Physical Exam               Signed Electronically by: Oleg Bañuelos MD    "

## 2024-10-14 DIAGNOSIS — J45.30 MILD PERSISTENT ASTHMA, UNSPECIFIED WHETHER COMPLICATED: ICD-10-CM

## 2024-10-15 RX ORDER — BUDESONIDE AND FORMOTEROL FUMARATE DIHYDRATE 160; 4.5 UG/1; UG/1
1 AEROSOL RESPIRATORY (INHALATION) 2 TIMES DAILY
Qty: 10.2 G | Refills: 1 | Status: SHIPPED | OUTPATIENT
Start: 2024-10-15

## 2024-10-30 ENCOUNTER — TELEPHONE (OUTPATIENT)
Dept: OPHTHALMOLOGY | Facility: CLINIC | Age: 59
End: 2024-10-30
Payer: COMMERCIAL

## 2024-11-02 ENCOUNTER — OFFICE VISIT (OUTPATIENT)
Dept: OPHTHALMOLOGY | Facility: CLINIC | Age: 59
End: 2024-11-02
Attending: INTERNAL MEDICINE
Payer: COMMERCIAL

## 2024-11-02 DIAGNOSIS — E11.22 TYPE 2 DIABETES MELLITUS WITH STAGE 3A CHRONIC KIDNEY DISEASE, WITHOUT LONG-TERM CURRENT USE OF INSULIN (H): ICD-10-CM

## 2024-11-02 DIAGNOSIS — N18.31 TYPE 2 DIABETES MELLITUS WITH STAGE 3A CHRONIC KIDNEY DISEASE, WITHOUT LONG-TERM CURRENT USE OF INSULIN (H): ICD-10-CM

## 2024-11-02 DIAGNOSIS — E11.9 DIABETIC EYE EXAM (H): Primary | ICD-10-CM

## 2024-11-02 DIAGNOSIS — Z01.00 DIABETIC EYE EXAM (H): Primary | ICD-10-CM

## 2024-11-02 ASSESSMENT — VISUAL ACUITY
OD_SC+: -1
METHOD: SNELLEN - LINEAR
OS_SC+: -3
OD_SC: 20/40
OS_SC: 20/25

## 2024-11-02 ASSESSMENT — CONF VISUAL FIELD
OD_SUPERIOR_TEMPORAL_RESTRICTION: 0
OS_INFERIOR_NASAL_RESTRICTION: 0
OD_INFERIOR_TEMPORAL_RESTRICTION: 0
OS_SUPERIOR_TEMPORAL_RESTRICTION: 0
OD_INFERIOR_NASAL_RESTRICTION: 0
OD_NORMAL: 1
OS_NORMAL: 1
METHOD: COUNTING FINGERS
OD_SUPERIOR_NASAL_RESTRICTION: 0
OS_INFERIOR_TEMPORAL_RESTRICTION: 0
OS_SUPERIOR_NASAL_RESTRICTION: 0

## 2024-11-02 ASSESSMENT — SLIT LAMP EXAM - LIDS
COMMENTS: NORMAL
COMMENTS: NORMAL

## 2024-11-02 ASSESSMENT — TONOMETRY
IOP_METHOD: ICARE
OD_IOP_MMHG: 21
OS_IOP_MMHG: 20

## 2024-11-02 ASSESSMENT — REFRACTION_MANIFEST
OD_SPHERE: -1.00
OD_AXIS: 005
OS_SPHERE: -0.75
OD_ADD: +2.50
OD_CYLINDER: +0.75
OS_ADD: +2.50
OS_CYLINDER: SPHERE

## 2024-11-02 ASSESSMENT — CUP TO DISC RATIO
OD_RATIO: 0.45
OS_RATIO: 0.55

## 2024-11-02 ASSESSMENT — EXTERNAL EXAM - LEFT EYE: OS_EXAM: NORMAL

## 2024-11-02 ASSESSMENT — EXTERNAL EXAM - RIGHT EYE: OD_EXAM: NORMAL

## 2024-11-02 NOTE — PROGRESS NOTES
Assessment & Plan       Tyrone Lopez is a 59 year old male with the following diagnoses:   1. Diabetic eye exam (H) - Both Eyes    2. Type 2 diabetes mellitus with stage 3a chronic kidney disease, without long-term current use of insulin (H) - Both Eyes          BS good   Vision okay   No problems     NO DBR each eye  Explained diabetes is a small vessel disease and need for yearly exam  Reinforced BS control   NO DBR each eye  Explaine diabetes and need for yearly exam  Reinforced BS control  Yearly exam   New prescription for glasses        Patient disposition:   No follow-ups on file.          Complete documentation of historical and exam elements from today's encounter can be found in the full encounter summary report (not reduplicated in this progress note). I personally obtained the chief complaint(s) and history of present illness.  I confirmed and edited as necessary the review of systems, past medical/surgical history, family history, social history, and examination findings as documented by others; and I examined the patient myself. I personally reviewed the relevant tests, images, and reports as documented above. I formulated and edited as necessary the assessment and plan and discussed the findings and management plan with the patient and family.  Dr. Marlon Valdovinos

## 2024-11-02 NOTE — PATIENT INSTRUCTIONS
BS good   Vision okay   No problems     NO DBR each eye  Explained diabetes is a small vessel disease and need for yearly exam  Reinforced BS control   NO DBR each eye  Explaine diabetes and need for yearly exam  Reinforced BS control  Yearly exam   New prescription for glasses

## 2024-12-08 ENCOUNTER — HEALTH MAINTENANCE LETTER (OUTPATIENT)
Age: 59
End: 2024-12-08

## 2025-01-18 ENCOUNTER — HEALTH MAINTENANCE LETTER (OUTPATIENT)
Age: 60
End: 2025-01-18

## 2025-02-17 DIAGNOSIS — J45.30 MILD PERSISTENT ASTHMA, UNSPECIFIED WHETHER COMPLICATED: ICD-10-CM

## 2025-02-17 RX ORDER — BUDESONIDE AND FORMOTEROL FUMARATE DIHYDRATE 160; 4.5 UG/1; UG/1
1 AEROSOL RESPIRATORY (INHALATION) 2 TIMES DAILY
Qty: 10.2 G | Refills: 2 | Status: SHIPPED | OUTPATIENT
Start: 2025-02-17

## 2025-02-19 ENCOUNTER — MYC MEDICAL ADVICE (OUTPATIENT)
Dept: INTERNAL MEDICINE | Facility: CLINIC | Age: 60
End: 2025-02-19
Payer: COMMERCIAL

## 2025-02-19 DIAGNOSIS — I10 ESSENTIAL HYPERTENSION: ICD-10-CM

## 2025-03-03 RX ORDER — ATENOLOL 50 MG/1
50 TABLET ORAL DAILY
Qty: 30 TABLET | Refills: 0 | Status: SHIPPED | OUTPATIENT
Start: 2025-03-03 | End: 2025-03-04

## 2025-03-03 NOTE — TELEPHONE ENCOUNTER
Pt came in to clinic to recheck when next appt is, and to ask why we left a voicemail message.  Writer checked past encounters and relayed this one, which is most recent.    Pt confirms he is still taking atenolol and that he is out.  If refill cannot be approved, he can wait for his appt on 3/12 to mention it to PCP.

## 2025-03-04 DIAGNOSIS — I10 ESSENTIAL HYPERTENSION: ICD-10-CM

## 2025-03-04 RX ORDER — ATENOLOL 50 MG/1
50 TABLET ORAL DAILY
Qty: 90 TABLET | Refills: 0 | Status: SHIPPED | OUTPATIENT
Start: 2025-03-04

## 2025-03-04 NOTE — TELEPHONE ENCOUNTER
90 day prescription request please review.     Disp Refills Start End ANITA   atenolol (TENORMIN) 50 MG tablet 30 tablet 0 3/3/2025 -- No   Sig - Route: Take 1 tablet (50 mg) by mouth daily. - Oral   Sent to pharmacy as: Atenolol 50 MG Oral Tablet (TENORMIN)   Class: E-Prescribe   Order: [id number]   E-Prescribing Status: Receipt confirmed by pharmacy (3/3/2025  1:58 PM CST)

## 2025-03-12 ENCOUNTER — OFFICE VISIT (OUTPATIENT)
Dept: INTERNAL MEDICINE | Facility: CLINIC | Age: 60
End: 2025-03-12
Payer: COMMERCIAL

## 2025-03-12 VITALS
HEART RATE: 70 BPM | DIASTOLIC BLOOD PRESSURE: 105 MMHG | HEIGHT: 63 IN | BODY MASS INDEX: 37.09 KG/M2 | RESPIRATION RATE: 15 BRPM | SYSTOLIC BLOOD PRESSURE: 155 MMHG | WEIGHT: 209.3 LBS | TEMPERATURE: 98.4 F | OXYGEN SATURATION: 99 %

## 2025-03-12 DIAGNOSIS — E78.2 MIXED HYPERLIPIDEMIA: ICD-10-CM

## 2025-03-12 DIAGNOSIS — I10 PRIMARY HYPERTENSION: ICD-10-CM

## 2025-03-12 DIAGNOSIS — M79.10 MUSCULAR ACHES: ICD-10-CM

## 2025-03-12 DIAGNOSIS — Z87.442 HISTORY OF URINARY STONE: ICD-10-CM

## 2025-03-12 DIAGNOSIS — N18.31 TYPE 2 DIABETES MELLITUS WITH STAGE 3A CHRONIC KIDNEY DISEASE, WITHOUT LONG-TERM CURRENT USE OF INSULIN (H): Primary | ICD-10-CM

## 2025-03-12 DIAGNOSIS — E11.22 TYPE 2 DIABETES MELLITUS WITH STAGE 3A CHRONIC KIDNEY DISEASE, WITHOUT LONG-TERM CURRENT USE OF INSULIN (H): Primary | ICD-10-CM

## 2025-03-12 DIAGNOSIS — Z87.39 HISTORY OF GOUT: ICD-10-CM

## 2025-03-12 DIAGNOSIS — M25.571 PAIN IN JOINT INVOLVING ANKLE AND FOOT, RIGHT: ICD-10-CM

## 2025-03-12 PROBLEM — J45.20 MILD INTERMITTENT ASTHMA WITHOUT COMPLICATION: Status: RESOLVED | Noted: 2023-09-07 | Resolved: 2025-03-12

## 2025-03-12 LAB
ALBUMIN UR-MCNC: 30 MG/DL
APPEARANCE UR: CLEAR
BACTERIA #/AREA URNS HPF: ABNORMAL /HPF
BILIRUB UR QL STRIP: NEGATIVE
COLOR UR AUTO: YELLOW
EST. AVERAGE GLUCOSE BLD GHB EST-MCNC: 160 MG/DL
GLUCOSE UR STRIP-MCNC: NEGATIVE MG/DL
HBA1C MFR BLD: 7.2 % (ref 0–5.6)
HGB UR QL STRIP: ABNORMAL
KETONES UR STRIP-MCNC: NEGATIVE MG/DL
LEUKOCYTE ESTERASE UR QL STRIP: ABNORMAL
NITRATE UR QL: NEGATIVE
PH UR STRIP: 6 [PH] (ref 5–8)
RBC #/AREA URNS AUTO: ABNORMAL /HPF
SP GR UR STRIP: 1.02 (ref 1–1.03)
UROBILINOGEN UR STRIP-ACNC: 0.2 E.U./DL
WBC #/AREA URNS AUTO: ABNORMAL /HPF

## 2025-03-12 PROCEDURE — 84550 ASSAY OF BLOOD/URIC ACID: CPT | Performed by: INTERNAL MEDICINE

## 2025-03-12 PROCEDURE — 36415 COLL VENOUS BLD VENIPUNCTURE: CPT | Performed by: INTERNAL MEDICINE

## 2025-03-12 PROCEDURE — 82570 ASSAY OF URINE CREATININE: CPT | Performed by: INTERNAL MEDICINE

## 2025-03-12 PROCEDURE — 83036 HEMOGLOBIN GLYCOSYLATED A1C: CPT | Performed by: INTERNAL MEDICINE

## 2025-03-12 PROCEDURE — G2211 COMPLEX E/M VISIT ADD ON: HCPCS | Performed by: INTERNAL MEDICINE

## 2025-03-12 PROCEDURE — 1125F AMNT PAIN NOTED PAIN PRSNT: CPT | Performed by: INTERNAL MEDICINE

## 2025-03-12 PROCEDURE — 82550 ASSAY OF CK (CPK): CPT | Performed by: INTERNAL MEDICINE

## 2025-03-12 PROCEDURE — 99214 OFFICE O/P EST MOD 30 MIN: CPT | Performed by: INTERNAL MEDICINE

## 2025-03-12 PROCEDURE — 81001 URINALYSIS AUTO W/SCOPE: CPT | Performed by: INTERNAL MEDICINE

## 2025-03-12 PROCEDURE — 3080F DIAST BP >= 90 MM HG: CPT | Performed by: INTERNAL MEDICINE

## 2025-03-12 PROCEDURE — 82043 UR ALBUMIN QUANTITATIVE: CPT | Performed by: INTERNAL MEDICINE

## 2025-03-12 PROCEDURE — 3077F SYST BP >= 140 MM HG: CPT | Performed by: INTERNAL MEDICINE

## 2025-03-12 RX ORDER — METHYLPREDNISOLONE 4 MG/1
TABLET ORAL
Qty: 21 TABLET | Refills: 0 | Status: SHIPPED | OUTPATIENT
Start: 2025-03-12

## 2025-03-12 ASSESSMENT — ASTHMA QUESTIONNAIRES
ACT_TOTALSCORE: 21
QUESTION_5 LAST FOUR WEEKS HOW WOULD YOU RATE YOUR ASTHMA CONTROL: WELL CONTROLLED
ACT_TOTALSCORE: 21
QUESTION_2 LAST FOUR WEEKS HOW OFTEN HAVE YOU HAD SHORTNESS OF BREATH: NOT AT ALL
QUESTION_3 LAST FOUR WEEKS HOW OFTEN DID YOUR ASTHMA SYMPTOMS (WHEEZING, COUGHING, SHORTNESS OF BREATH, CHEST TIGHTNESS OR PAIN) WAKE YOU UP AT NIGHT OR EARLIER THAN USUAL IN THE MORNING: NOT AT ALL
QUESTION_4 LAST FOUR WEEKS HOW OFTEN HAVE YOU USED YOUR RESCUE INHALER OR NEBULIZER MEDICATION (SUCH AS ALBUTEROL): ONE OR TWO TIMES PER DAY
QUESTION_1 LAST FOUR WEEKS HOW MUCH OF THE TIME DID YOUR ASTHMA KEEP YOU FROM GETTING AS MUCH DONE AT WORK, SCHOOL OR AT HOME: NONE OF THE TIME

## 2025-03-12 ASSESSMENT — PAIN SCALES - GENERAL: PAINLEVEL_OUTOF10: SEVERE PAIN (8)

## 2025-03-12 NOTE — PROGRESS NOTES
Assessment & Plan     (E11.22,  N18.31) Type 2 diabetes mellitus with stage 3a chronic kidney disease, without long-term current use of insulin (H)  (primary encounter diagnosis)  Comment: has been reasonably well controlled  Plan: HEMOGLOBIN A1C, Albumin Random Urine         Quantitative with Creat Ratio        Recheck labs  Eye exam up to date  Depending on results, see back in 3-6 months    (I10) Primary hypertension  Comment: quite high today though has had increased pain in his right foot, see below.  Plan: recommended repeat RN bp visit in 2 weeks. May need additional adjustments at that time.    (E78.2) Mixed hyperlipidemia  Comment: on statin  Plan: Comprehensive metabolic panel        Some back/thigh pains. Unclear if this is statin related, never had problems with this med in the past. Consider holding if those pains do not improve over the coming few weeks. Discussed plan in detail. He will keep us updated.    (M25.571) Pain in joint involving ankle and foot, right  Comment: right--achilles vs other. Some edema, no significant warmth so not sure about gout.  Plan: Orthopedic  Referral,         methylPREDNISolone (MEDROL DOSEPAK) 4 MG tablet        therapy pack        Given the duration and recurrence of this problem, recommended podiatry referral. Will start medrol dose micaela for now given that he is hobbling around at this point to see if this can give some short term relief for now. Understands the potential hyperglycemic effect.    (M79.10) Muscular aches  Comment: nonspecific, see above  Plan: CK total        May need to hold statin. Plan as above.    The longitudinal plan of care for the diagnosis(es)/condition(s) as documented were addressed during this visit. Due to the added complexity in care, I will continue to support Tyrone in the subsequent management and with ongoing continuity of care.         BMI  Estimated body mass index is 37.08 kg/m  as calculated from the following:    Height as  "of this encounter: 1.6 m (5' 3\").    Weight as of this encounter: 94.9 kg (209 lb 4.8 oz).             Baltazar Hansen is a 59 year old, presenting for the following health issues:  office visit (Patient reports they are here for 6 month follow up. New changes - reports gout in feet and R hand started in 2017 but it has gotten so severe it's hard for him to walk. Discuss  both thighs, back, and stomach pain - burning sensation since having kidney stone around March 2024. Feels like face (all over face) is tight, swollen, like there is pressure. )        3/12/2025    11:03 AM   Additional Questions   Roomed by Gaurav   Accompanied by alone     History of Present Illness       Reason for visit:  Gout, pain    He eats 0-1 servings of fruits and vegetables daily.He consumes 1 sweetened beverage(s) daily.He exercises with enough effort to increase his heart rate 10 to 19 minutes per day.  He exercises with enough effort to increase his heart rate 7 days per week.   He is taking medications regularly.                      Objective    BP (!) 175/101 (BP Location: Right arm, Patient Position: Sitting, Cuff Size: Adult Large)   Pulse 70   Temp 98.4  F (36.9  C) (Temporal)   Resp 15   Ht 1.6 m (5' 3\")   Wt 94.9 kg (209 lb 4.8 oz)   SpO2 99%   BMI 37.08 kg/m    Body mass index is 37.08 kg/m .  Physical Exam               Signed Electronically by: Oleg Bañuelos MD    "

## 2025-03-12 NOTE — LETTER
3/12/2025    Tyrone Lopez   1965        To Whom it May Concern;    Please excuse Tyrone Lopez from work/school due an ongoing medical condition, starting today, 2025 through 2025. He would be able to return to work on 2025.            Sincerely,        Oleg Bañuelos MD

## 2025-03-13 LAB
ALBUMIN SERPL BCG-MCNC: 4.4 G/DL (ref 3.5–5.2)
ALP SERPL-CCNC: 92 U/L (ref 40–150)
ALT SERPL W P-5'-P-CCNC: 39 U/L (ref 0–70)
ANION GAP SERPL CALCULATED.3IONS-SCNC: 12 MMOL/L (ref 7–15)
AST SERPL W P-5'-P-CCNC: 23 U/L (ref 0–45)
BILIRUB SERPL-MCNC: 0.6 MG/DL
BUN SERPL-MCNC: 15.7 MG/DL (ref 8–23)
CALCIUM SERPL-MCNC: ABNORMAL MG/DL
CHLORIDE SERPL-SCNC: 107 MMOL/L (ref 98–107)
CK SERPL-CCNC: 87 U/L (ref 39–308)
CREAT SERPL-MCNC: 1.49 MG/DL (ref 0.67–1.17)
CREAT UR-MCNC: 66.3 MG/DL
EGFRCR SERPLBLD CKD-EPI 2021: 53 ML/MIN/1.73M2
GLUCOSE SERPL-MCNC: 110 MG/DL (ref 70–99)
HCO3 SERPL-SCNC: 21 MMOL/L (ref 22–29)
MICROALBUMIN UR-MCNC: 75.4 MG/L
MICROALBUMIN/CREAT UR: 113.73 MG/G CR (ref 0–17)
POTASSIUM SERPL-SCNC: 4.6 MMOL/L (ref 3.4–5.3)
PROT SERPL-MCNC: 7.5 G/DL (ref 6.4–8.3)
SODIUM SERPL-SCNC: 140 MMOL/L (ref 135–145)
URATE SERPL-MCNC: 6.4 MG/DL (ref 3.4–7)

## 2025-03-15 LAB
ALBUMIN SERPL BCG-MCNC: 4.4 G/DL (ref 3.5–5.2)
ALP SERPL-CCNC: 92 U/L (ref 40–150)
ALT SERPL W P-5'-P-CCNC: 39 U/L (ref 0–70)
ANION GAP SERPL CALCULATED.3IONS-SCNC: 12 MMOL/L (ref 7–15)
AST SERPL W P-5'-P-CCNC: 23 U/L (ref 0–45)
BILIRUB SERPL-MCNC: 0.6 MG/DL
BUN SERPL-MCNC: 15.7 MG/DL (ref 8–23)
CALCIUM SERPL-MCNC: 9.8 MG/DL (ref 8.8–10.4)
CHLORIDE SERPL-SCNC: 107 MMOL/L (ref 98–107)
CREAT SERPL-MCNC: 1.49 MG/DL (ref 0.67–1.17)
EGFRCR SERPLBLD CKD-EPI 2021: 53 ML/MIN/1.73M2
GLUCOSE SERPL-MCNC: 110 MG/DL (ref 70–99)
HCO3 SERPL-SCNC: 21 MMOL/L (ref 22–29)
POTASSIUM SERPL-SCNC: 4.6 MMOL/L (ref 3.4–5.3)
PROT SERPL-MCNC: 7.5 G/DL (ref 6.4–8.3)
SODIUM SERPL-SCNC: 140 MMOL/L (ref 135–145)

## 2025-03-17 ENCOUNTER — OFFICE VISIT (OUTPATIENT)
Dept: PODIATRY | Facility: CLINIC | Age: 60
End: 2025-03-17
Attending: INTERNAL MEDICINE
Payer: COMMERCIAL

## 2025-03-17 ENCOUNTER — ANCILLARY PROCEDURE (OUTPATIENT)
Dept: GENERAL RADIOLOGY | Facility: CLINIC | Age: 60
End: 2025-03-17
Attending: PODIATRIST
Payer: COMMERCIAL

## 2025-03-17 ENCOUNTER — TELEPHONE (OUTPATIENT)
Dept: INTERNAL MEDICINE | Facility: CLINIC | Age: 60
End: 2025-03-17

## 2025-03-17 VITALS — BODY MASS INDEX: 37.03 KG/M2 | WEIGHT: 209 LBS | HEIGHT: 63 IN

## 2025-03-17 DIAGNOSIS — Z79.4 TYPE 2 DIABETES MELLITUS WITH STAGE 3A CHRONIC KIDNEY DISEASE, WITH LONG-TERM CURRENT USE OF INSULIN (H): ICD-10-CM

## 2025-03-17 DIAGNOSIS — M76.61 ACHILLES TENDINITIS, RIGHT LEG: Primary | ICD-10-CM

## 2025-03-17 DIAGNOSIS — E11.22 TYPE 2 DIABETES MELLITUS WITH STAGE 3A CHRONIC KIDNEY DISEASE, WITH LONG-TERM CURRENT USE OF INSULIN (H): ICD-10-CM

## 2025-03-17 DIAGNOSIS — N18.31 TYPE 2 DIABETES MELLITUS WITH STAGE 3A CHRONIC KIDNEY DISEASE, WITH LONG-TERM CURRENT USE OF INSULIN (H): ICD-10-CM

## 2025-03-17 DIAGNOSIS — M25.571 PAIN IN JOINT INVOLVING ANKLE AND FOOT, RIGHT: ICD-10-CM

## 2025-03-17 DIAGNOSIS — M76.61 ACHILLES TENDINITIS, RIGHT LEG: ICD-10-CM

## 2025-03-17 PROCEDURE — 73650 X-RAY EXAM OF HEEL: CPT | Mod: TC | Performed by: RADIOLOGY

## 2025-03-17 NOTE — TELEPHONE ENCOUNTER
Forms/Letter Request    Type of form/letter: OTHER: Work Note       Do we have the form/letter: No    Who is the form from? Patient needs a note for his foot pain     Where did/will the form come from? Patient or family brought in       When is form/letter needed by: asap    How would you like the form/letter returned: [employer] or if possible email    Patient Notified form requests are processed in 5-7 business days:Yes    Could we send this information to you in [facility] or would you prefer to receive a phone call?:   Patient would prefer a phone call   Okay to leave a detailed message?: Yes at Cell number on file:    Telephone Information:   Mobile [phone]

## 2025-03-17 NOTE — LETTER
March 17, 2025      Tyrone Lopez  [address] [place] [address]        To Whom It May Concern:    Tyrone Lopez  was seen on 3/17/25.  Please excuse him until 3/21/25 due to inability to walk or stand for an extended period of time. He may return to work 3/24/25 and may require the accomodation of a cane and or walking boot for a short period of time.        Sincerely,        Tere Velásquez DPM    Electronically signed

## 2025-03-17 NOTE — LETTER
3/17/2025      Tyrone Lopez  [address] [place] [address]      Dear Colleague,    Thank you for referring your patient, Tyrone Lopez, to the [facility] PODI[facility]. Please see a copy of my visit note below.    PATIENT HISTORY:  Dr. Bañuelos requested I see this patient for their foot issue.      Tyrone Lopez is a 59 year old male who presents to clinic for right ankle and foot pain of gradual onset starting in 2017. The pain was initially intermittent. He was treating with tylenol and ibuprofen which used to help but in the last 2 weeks it is not working. Patient admits to right ankle and foot pain has suddenly changed to constant and burning pointing to the inside ankle. He has recently been to his PCP who started him on a medrol dose pack 3/12/2025 which he completed noting some pain relief but the pain is still present. He has DM type II with stage 3 kidney disease. He denies any injury or trauma. The pain is worse with walking and standing. He has been treating with tennis shoes and uses a single cane. He has no pain at rest. He is noting swelling to inside of his ankle constant, worse end of the day.      An  was present throughout the entire office visit.     Review of Systems:  Patient denies fever, chills, rash, wound, stiffness, limping, numbness, weakness, heart burn, blood in stool, chest pain with activity, calf pain when walking, shortness of breath with activity, chronic cough, easy bleeding/bruising, swelling of ankles, excessive thirst, fatigue, depression, anxiety.    PAST MEDICAL HISTORY:   Past Medical History:   Diagnosis Date     Hypertension      Type 2 diabetes mellitus with stage 3a chronic kidney disease, without long-term current use of insulin (H) 5/16/2024     Uncomplicated asthma         PAST SURGICAL HISTORY:   Past Surgical History:   Procedure Laterality Date     APPENDECTOMY       COMBINED CYSTOSCOPY, RETROGRADES, URETEROSCOPY, LASER HOLMIUM LITHOTRIPSY  URETER(S), INSERT STENT Left 11/3/2023    Procedure: Cystoscopy, Left Ureteroscopy, Left Retrograde Pyelogram, Left Laser Lithotripsy, Stone Basket Extraction, Left Ureteral Stent Placement;  Surgeon: Rory Noonan MD;  Location: [facility]        MEDICATIONS:   Current Outpatient Medications:      albuterol (PROAIR HFA/PROVENTIL HFA/VENTOLIN HFA) 108 (90 Base) MCG/ACT inhaler, Inhale 1-2 puffs into the lungs every 6 hours as needed for shortness of breath or wheezing, Disp: 18 g, Rfl: 3     allopurinol (ZYLOPRIM) 100 MG tablet, Take 1 tablet (100 mg) by mouth daily, Disp: 90 tablet, Rfl: 3     atenolol (TENORMIN) 50 MG tablet, Take 1 tablet (50 mg) by mouth daily., Disp: 90 tablet, Rfl: 0     atorvastatin (LIPITOR) 20 MG tablet, Take 1 tablet (20 mg) by mouth daily, Disp: 90 tablet, Rfl: 3     budesonide-formoterol (SYMBICORT) 160-4.5 MCG/ACT Inhaler, INHALE 1 PUFF INTO THE LUNGS TWICE DAILY, Disp: 10.2 g, Rfl: 2     methylPREDNISolone (MEDROL DOSEPAK) 4 MG tablet therapy pack, Follow Package Directions, Disp: 21 tablet, Rfl: 0     montelukast (SINGULAIR) 10 MG tablet, Take 1 tablet (10 mg) by mouth at bedtime., Disp: 90 tablet, Rfl: 3     ALLERGIES:  No Known Allergies     SOCIAL HISTORY:   Social History     Socioeconomic History     Marital status:      Spouse name: Not on file     Number of children: Not on file     Years of education: Not on file     Highest education level: Not on file   Occupational History     Not on file   Tobacco Use     Smoking status: Never     Passive exposure: Never     Smokeless tobacco: Never   Vaping Use     Vaping status: Never Used   Substance and Sexual Activity     Alcohol use: No     Drug use: No     Sexual activity: Not on file   Other Topics Concern     Not on file   Social History Narrative     Not on file     Social Drivers of Health     Financial Resource Strain: Low Risk  (10/26/2023)    Financial Resource Strain      Within the past 12 months, have  you or your family members you live with been unable to get utilities (heat, electricity) when it was really needed?: No   Food Insecurity: Low Risk  (10/26/2023)    Food Insecurity      Within the past 12 months, did you worry that your food would run out before you got money to buy more?: No      Within the past 12 months, did the food you bought just not last and you didn t have money to get more?: No   Transportation Needs: Low Risk  (10/26/2023)    Transportation Needs      Within the past 12 months, has lack of transportation kept you from medical appointments, getting your medicines, non-medical meetings or appointments, work, or from getting things that you need?: No   Physical Activity: Not on file   Stress: Not on file   Social Connections: Not on file   Interpersonal Safety: Low Risk  (3/12/2025)    Interpersonal Safety      Do you feel physically and emotionally safe where you currently live?: Yes      Within the past 12 months, have you been hit, slapped, kicked or otherwise physically hurt by someone?: No      Within the past 12 months, have you been humiliated or emotionally abused in other ways by your partner or ex-partner?: No   Housing Stability: Low Risk  (10/26/2023)    Housing Stability      Do you have housing? : Yes      Are you worried about losing your housing?: No        FAMILY HISTORY: No family history on file.     EXAM:Vitals: There were no vitals taken for this visit.  BMI= There is no height or weight on file to calculate BMI.    A1C:   Hemoglobin A1C   Date Value Ref Range Status   03/12/2025 7.2 (H) 0.0 - 5.6 % Final     Comment:     Normal <5.7%   Prediabetes 5.7-6.4%    Diabetes 6.5% or higher     Note: Adopted from ADA consensus guidelines.        General appearance: Patient is alert and fully cooperative with history & exam.  No sign of distress is noted during the visit.     Psychiatric: Affect is pleasant & appropriate.  Patient appears motivated to improve health.      Respiratory: Breathing is regular & unlabored while sitting.     HEENT: Hearing is intact to spoken word.  Speech is clear.  No gross evidence of visual impairment that would impact ambulation.    Lower Extremity Focused:    Dermatologic: Skin is intact to both lower extremities without significant lesions, rash or abrasion.  No paronychia or evidence of soft tissue infection is noted.     Vascular: DP pulse 2/4 right 2/4 left PT pulse 2/4 right 2/4 left.  No significant edema or varicosities noted.  CFT and skin temperature is normal to both lower extremities.     Neurologic: Lower extremity sensation is intact to light touch.  No evidence of weakness or contracture in the lower extremities.  No evidence of neuropathy.     Musculoskeletal: Right pain along the achilles tendon at the insertion on the calcaneal tuber right with positive thickening. No pain extending proximal along the achilles tendon. No deficit palpated within the right achilles tendon. No pain with palpation to the medial and lateral ankle. No pain with lateral compression of the right calcaneal tuber. Normal range of motion ankle joint bilateral supple, within normal limits. No pain with range of motion subtalar joint bilateral, No pain along the peroneal tendons. No pain along the posterior tibial tendon. Pain pain with toe raise right. 5/5 muscle strength posterior muscle group equal bilateral. Increased arch height bilateral.     Gait: antalgic right single cane assist    Radiographs:  I personally reviewed per my read a small posterior calcaneal spur noted at the achilles tendon insertion, plantar calcaneal heel spur, increased calcaneal inclination. No fracture noted.      ASSESSMENT:   Right insertional achilles tendinosis with tendinitis  Pes cavus bilateral congenital  Right ankle pain  DM type II with chronic kidney disease stage 3     Medical Decision Making/Plan:  Reviewed [facility] chart in [facility].  Reviewed and discussed causes of  tendonitis.  We discussed treatments such as immobiliation, icing, stretching, heel lifts, orthotics, physical therapy, MRI.    X-rays right calcaneus ordered today to rule out calcaneal posterior heel spur, noting per my read a small posterior calcaneal spur  2.   Physical therapy consulted for iontophoresis, stretching, strengthening, gait training, graston  3. Ice daily 20 minutes   4. Night splint to help stretch out achilles tendon  5. Letter for work to be off through this week due to inability to perform duties, currently limited walking/standing.   6. Continue with tylenol/ibuprofen as needed for pain.   7. Low impact activity at this time. You may resume biking per pain tolerance  8. This may take 4-6 months for resolution.   9. Dispensed and fit short CAM boot today to alleviate pain. Wear the boot with walking or standing for the next 2-4 weeks per pain.   10. Follow-up in 6 weeks.     All questions were answered to patients satisfaction and they will call with further questions or concerns.       Tere Velásquez DPM      Again, thank you for allowing me to participate in the care of your patient.        Sincerely,        Tere Velásquez DPM    Electronically signed

## 2025-03-17 NOTE — PROGRESS NOTES
PATIENT HISTORY:  Dr. Bañuelos requested I see this patient for their foot issue.      Tyrone Lopez is a 59 year old male who presents to clinic for right ankle and foot pain of gradual onset starting in 2017. The pain was initially intermittent. He was treating with tylenol and ibuprofen which used to help but in the last 2 weeks it is not working. Patient admits to right ankle and foot pain has suddenly changed to constant and burning pointing to the inside ankle. He has recently been to his PCP who started him on a medrol dose pack 3/12/2025 which he completed noting some pain relief but the pain is still present. He has DM type II with stage 3 kidney disease. He denies any injury or trauma. The pain is worse with walking and standing. He has been treating with tennis shoes and uses a single cane. He has no pain at rest. He is noting swelling to inside of his ankle constant, worse end of the day.      An  was present throughout the entire office visit.     Review of Systems:  Patient denies fever, chills, rash, wound, stiffness, limping, numbness, weakness, heart burn, blood in stool, chest pain with activity, calf pain when walking, shortness of breath with activity, chronic cough, easy bleeding/bruising, swelling of ankles, excessive thirst, fatigue, depression, anxiety.    PAST MEDICAL HISTORY:   Past Medical History:   Diagnosis Date    Hypertension     Type 2 diabetes mellitus with stage 3a chronic kidney disease, without long-term current use of insulin (H) 5/16/2024    Uncomplicated asthma         PAST SURGICAL HISTORY:   Past Surgical History:   Procedure Laterality Date    APPENDECTOMY      COMBINED CYSTOSCOPY, RETROGRADES, URETEROSCOPY, LASER HOLMIUM LITHOTRIPSY URETER(S), INSERT STENT Left 11/3/2023    Procedure: Cystoscopy, Left Ureteroscopy, Left Retrograde Pyelogram, Left Laser Lithotripsy, Stone Basket Extraction, Left Ureteral Stent Placement;  Surgeon: Rory Noonan MD;  Location:  [facility]        MEDICATIONS:   Current Outpatient Medications:     albuterol (PROAIR HFA/PROVENTIL HFA/VENTOLIN HFA) 108 (90 Base) MCG/ACT inhaler, Inhale 1-2 puffs into the lungs every 6 hours as needed for shortness of breath or wheezing, Disp: 18 g, Rfl: 3    allopurinol (ZYLOPRIM) 100 MG tablet, Take 1 tablet (100 mg) by mouth daily, Disp: 90 tablet, Rfl: 3    atenolol (TENORMIN) 50 MG tablet, Take 1 tablet (50 mg) by mouth daily., Disp: 90 tablet, Rfl: 0    atorvastatin (LIPITOR) 20 MG tablet, Take 1 tablet (20 mg) by mouth daily, Disp: 90 tablet, Rfl: 3    budesonide-formoterol (SYMBICORT) 160-4.5 MCG/ACT Inhaler, INHALE 1 PUFF INTO THE LUNGS TWICE DAILY, Disp: 10.2 g, Rfl: 2    methylPREDNISolone (MEDROL DOSEPAK) 4 MG tablet therapy pack, Follow Package Directions, Disp: 21 tablet, Rfl: 0    montelukast (SINGULAIR) 10 MG tablet, Take 1 tablet (10 mg) by mouth at bedtime., Disp: 90 tablet, Rfl: 3     ALLERGIES:  No Known Allergies     SOCIAL HISTORY:   Social History     Socioeconomic History    Marital status:      Spouse name: Not on file    Number of children: Not on file    Years of education: Not on file    Highest education level: Not on file   Occupational History    Not on file   Tobacco Use    Smoking status: Never     Passive exposure: Never    Smokeless tobacco: Never   Vaping Use    Vaping status: Never Used   Substance and Sexual Activity    Alcohol use: No    Drug use: No    Sexual activity: Not on file   Other Topics Concern    Not on file   Social History Narrative    Not on file     Social Drivers of Health     Financial Resource Strain: Low Risk  (10/26/2023)    Financial Resource Strain     Within the past 12 months, have you or your family members you live with been unable to get utilities (heat, electricity) when it was really needed?: No   Food Insecurity: Low Risk  (10/26/2023)    Food Insecurity     Within the past 12 months, did you worry that your food would run out  before you got money to buy more?: No     Within the past 12 months, did the food you bought just not last and you didn t have money to get more?: No   Transportation Needs: Low Risk  (10/26/2023)    Transportation Needs     Within the past 12 months, has lack of transportation kept you from medical appointments, getting your medicines, non-medical meetings or appointments, work, or from getting things that you need?: No   Physical Activity: Not on file   Stress: Not on file   Social Connections: Not on file   Interpersonal Safety: Low Risk  (3/12/2025)    Interpersonal Safety     Do you feel physically and emotionally safe where you currently live?: Yes     Within the past 12 months, have you been hit, slapped, kicked or otherwise physically hurt by someone?: No     Within the past 12 months, have you been humiliated or emotionally abused in other ways by your partner or ex-partner?: No   Housing Stability: Low Risk  (10/26/2023)    Housing Stability     Do you have housing? : Yes     Are you worried about losing your housing?: No        FAMILY HISTORY: No family history on file.     EXAM:Vitals: There were no vitals taken for this visit.  BMI= There is no height or weight on file to calculate BMI.    A1C:   Hemoglobin A1C   Date Value Ref Range Status   03/12/2025 7.2 (H) 0.0 - 5.6 % Final     Comment:     Normal <5.7%   Prediabetes 5.7-6.4%    Diabetes 6.5% or higher     Note: Adopted from ADA consensus guidelines.        General appearance: Patient is alert and fully cooperative with history & exam.  No sign of distress is noted during the visit.     Psychiatric: Affect is pleasant & appropriate.  Patient appears motivated to improve health.     Respiratory: Breathing is regular & unlabored while sitting.     HEENT: Hearing is intact to spoken word.  Speech is clear.  No gross evidence of visual impairment that would impact ambulation.    Lower Extremity Focused:    Dermatologic: Skin is intact to both lower  extremities without significant lesions, rash or abrasion.  No paronychia or evidence of soft tissue infection is noted.     Vascular: DP pulse 2/4 right 2/4 left PT pulse 2/4 right 2/4 left.  No significant edema or varicosities noted.  CFT and skin temperature is normal to both lower extremities.     Neurologic: Lower extremity sensation is intact to light touch.  No evidence of weakness or contracture in the lower extremities.  No evidence of neuropathy.     Musculoskeletal: Right pain along the achilles tendon at the insertion on the calcaneal tuber right with positive thickening. No pain extending proximal along the achilles tendon. No deficit palpated within the right achilles tendon. No pain with palpation to the medial and lateral ankle. No pain with lateral compression of the right calcaneal tuber. Normal range of motion ankle joint bilateral supple, within normal limits. No pain with range of motion subtalar joint bilateral, No pain along the peroneal tendons. No pain along the posterior tibial tendon. Pain pain with toe raise right. 5/5 muscle strength posterior muscle group equal bilateral. Increased arch height bilateral.     Gait: antalgic right single cane assist    Radiographs:  I personally reviewed per my read a small posterior calcaneal spur noted at the achilles tendon insertion, plantar calcaneal heel spur, increased calcaneal inclination. No fracture noted.      ASSESSMENT:   Right insertional achilles tendinosis with tendinitis  Pes cavus bilateral congenital  Right ankle pain  DM type II with chronic kidney disease stage 3     Medical Decision Making/Plan:  [facility].  Reviewed and discussed causes of tendonitis.  We discussed treatments such as immobiliation, icing, stretching, heel lifts, orthotics, physical therapy, MRI.    X-rays right calcaneus ordered today to rule out calcaneal posterior heel spur, noting per my read a small posterior calcaneal spur  2.    Physical therapy consulted for iontophoresis, stretching, strengthening, gait training, graston  3. Ice daily 20 minutes   4. Night splint to help stretch out achilles tendon  5. Letter for work to be off through this week due to inability to perform duties, currently limited walking/standing.   6. Continue with tylenol/ibuprofen as needed for pain.   7. Low impact activity at this time. You may resume biking per pain tolerance  8. This may take 4-6 months for resolution.   9. Dispensed and fit short CAM boot today to alleviate pain. Wear the boot with walking or standing for the next 2-4 weeks per pain.   10. Follow-up in 6 weeks.     All questions were answered to patients satisfaction and they will call with further questions or concerns.       Tere Velásquez DPM

## 2025-03-17 NOTE — PATIENT INSTRUCTIONS
Thank you for choosing [facility] Podiatry / Foot & Ankle Surgery!    DR SULLIVAN CLINIC:  [facility]   [address]   [place] [address]   (Mon, Tues)     [facility]  [address], [place] [address]  (Friday)    [facility]  [address]  [place] [address]  (Wednesdays)       TRIAGE LINE: [phone]  APPOINTMENTS: [phone]  RADIOLOGY: [phone]  SET UP SURGERY: [phone]  PHYSICAL THERAPY: [phone]   BILLING QUESTIONS: [phone]  FAX: [phone]         Follow up: 6 weeks  Right achilles tendinitis with tendinosis     X-rays right calcaneus ordered today to rule out calcaneal posterior heel spur  2.   Physical therapy consulted for iontophoresis, stretching, strengthening, gait training, graston  3. Ice daily 20 minutes   4. Night splint to help stretch out achilles tendon  5. Letter for work to be off through this week due to inability to perform duties, currently limited walking/standing.   6. Continue with tylenol/ibuprofen as needed for pain.   7. Low impact activity at this time. You may resume biking per pain tolerance  8. This may take 4-6 months for resolution.   9. Dispensed and fit short CAM boot today to alleviate pain. Wear the boot with walking or standing for the next 2-4 weeks per pain.   10. Follow-up in 6 weeks.

## 2025-03-26 ENCOUNTER — ALLIED HEALTH/NURSE VISIT (OUTPATIENT)
Dept: FAMILY MEDICINE | Facility: CLINIC | Age: 60
End: 2025-03-26
Payer: COMMERCIAL

## 2025-03-26 ENCOUNTER — TELEPHONE (OUTPATIENT)
Dept: INTERNAL MEDICINE | Facility: CLINIC | Age: 60
End: 2025-03-26

## 2025-03-26 VITALS
RESPIRATION RATE: 22 BRPM | HEIGHT: 63 IN | DIASTOLIC BLOOD PRESSURE: 80 MMHG | BODY MASS INDEX: 37.03 KG/M2 | TEMPERATURE: 97.8 F | OXYGEN SATURATION: 97 % | HEART RATE: 69 BPM | SYSTOLIC BLOOD PRESSURE: 120 MMHG

## 2025-03-26 DIAGNOSIS — Z01.30 BLOOD PRESSURE CHECK: Primary | ICD-10-CM

## 2025-03-26 PROCEDURE — 99207 PR NO CHARGE NURSE ONLY: CPT

## 2025-03-26 PROCEDURE — 3074F SYST BP LT 130 MM HG: CPT

## 2025-03-26 PROCEDURE — 3079F DIAST BP 80-89 MM HG: CPT

## 2025-03-26 PROCEDURE — 1126F AMNT PAIN NOTED NONE PRSNT: CPT

## 2025-03-26 ASSESSMENT — PAIN SCALES - GENERAL: PAINLEVEL_OUTOF10: NO PAIN (0)

## 2025-03-26 NOTE — PROGRESS NOTES
"I met with Tyrone Lopez at the request of Dr. Bañuelos to recheck his blood pressure.  Blood pressure medications on the med list were reviewed with patient.    Patient has taken all medications as per usual regimen: Yes  Patient reports tolerating them without any issues or concerns: Yes    Vitals:    03/26/25 1050   BP: 120/80   BP Location: Left arm   Patient Position: Sitting   Cuff Size: Adult Large   Pulse: 69   Resp: 22   Temp: 97.8  F (36.6  C)   TempSrc: Temporal   SpO2: 97%   Height: 1.6 m (5' 2.99\")       BP Readings from Last 3 Encounters:   03/26/25 120/80   03/12/25 (!) 155/105   09/11/24 130/82        Blood pressure was taken, previous encounter was reviewed, recorded blood pressure below 140/90.  Patient was discharged and the note will be sent to the provider for final review.     Pt denies pain today. Pt states that his feet feel much better with no pain. Pt denies headaches, vision changes, nausea/vomiting. Pt states he finished his Medrol Dose Pack last Tuesday 3/18/25.     Pt had no additional questions at bp check appt. Pt has a virtual appt/follow up on 4/09/25.   "

## 2025-03-26 NOTE — TELEPHONE ENCOUNTER
"Pt checked in for RN visit, scheduled VV 4/09 per Dr. Bañuelos.           Tyrone,  It was good to see you again  Your labs show that the diabetes is getting a bit worse and now there is more protein in your urine--both signs that we need to get control of your blood pressure and sugar levels as soon as possible. We will see how the nurse visit goes and then make further decisions about your blood pressure but I will likely consider a medication to help both your blood pressure and kidneys stay as healthy as possible.  I think we may need to find additional treatment for your diabetes as well.  If possible, please schedule a \"virtual/video\" visit with me in early April if possible and we can review all of this in more detail.   Written by Oleg Bañuelos MD on 3/13/2025  9:41 PM CDT  "

## 2025-04-02 ENCOUNTER — THERAPY VISIT (OUTPATIENT)
Dept: PHYSICAL THERAPY | Facility: REHABILITATION | Age: 60
End: 2025-04-02
Attending: PODIATRIST
Payer: COMMERCIAL

## 2025-04-02 DIAGNOSIS — M25.571 PAIN IN JOINT INVOLVING ANKLE AND FOOT, RIGHT: ICD-10-CM

## 2025-04-02 DIAGNOSIS — M76.61 ACHILLES TENDINITIS, RIGHT LEG: ICD-10-CM

## 2025-04-02 PROBLEM — N18.31 TYPE 2 DIABETES MELLITUS WITH STAGE 3A CHRONIC KIDNEY DISEASE, WITH LONG-TERM CURRENT USE OF INSULIN (H): Status: ACTIVE | Noted: 2024-05-16

## 2025-04-02 PROBLEM — Z79.4 TYPE 2 DIABETES MELLITUS WITH STAGE 3A CHRONIC KIDNEY DISEASE, WITH LONG-TERM CURRENT USE OF INSULIN (H): Status: ACTIVE | Noted: 2024-05-16

## 2025-04-02 PROBLEM — E11.22 TYPE 2 DIABETES MELLITUS WITH STAGE 3A CHRONIC KIDNEY DISEASE, WITH LONG-TERM CURRENT USE OF INSULIN (H): Status: ACTIVE | Noted: 2024-05-16

## 2025-04-02 PROCEDURE — 97110 THERAPEUTIC EXERCISES: CPT | Mod: GP

## 2025-04-02 PROCEDURE — 97161 PT EVAL LOW COMPLEX 20 MIN: CPT | Mod: GP

## 2025-04-02 NOTE — PROGRESS NOTES
PHYSICAL THERAPY EVALUATION  Type of Visit: Evaluation       Fall Risk Screen:  Fall screen completed by: PT  Have you fallen 2 or more times in the past year?: No  Have you fallen and had an injury in the past year?: No  Is patient a fall risk?: No    Subjective         Presenting condition or subjective complaint:    Date of onset: 03/17/25    Relevant medical history:     Patient Active Problem List   Diagnosis    Nephrolithiasis    Allergic Rhinitis    Mixed hyperlipidemia    Class 2 severe obesity with serious comorbidity in adult, unspecified BMI, unspecified obesity type (H)    Mild persistent asthma, unspecified whether complicated    Calculus of proximal left ureter    Primary hypertension    Type 2 diabetes mellitus with stage 3a chronic kidney disease, with long-term current use of insulin (H)    History of gout    Pain in joint involving ankle and foot, right    Achilles tendinitis, right leg       Dates & types of surgery:      Prior diagnostic imaging/testing results:       Prior therapy history for the same diagnosis, illness or injury: No      Prior Level of Function  IND    Living Environment  Social support: With family members   Type of home: House   Stairs to enter the home: No       Ramp: No   Stairs inside the home: No       Help at home: None  Equipment owned:       Employment: Yes machine eperater  Hobbies/Interests:      Patient goals for therapy:      Pain assessment: Pain present     Objective   FOOT/ANKLE EVALUATION  PAIN: Pain Level at Rest: 0/10  Pain Level with Use: 10/10  Pain Location: R foot and ankle, heel    Pain Quality: Sharp, Shooting, and Stabbing  Pain Frequency: intermittent  Pain is Worst: with WB activity   Pain is Exacerbated By: walking, standing  Pain is Relieved By: improved diet, CAM boot, medication - prednisone short dose    Pain Progression: Improved  INTEGUMENTARY (edema, incisions): WNL  POSTURE: WFL  GAIT:   Weightbearing Status: WBAT  Assistive Device(s):  None  Gait Deviations: Antalgic  BALANCE/PROPRIOCEPTION:   WEIGHT BEARING ALIGNMENT:   NON-WEIGHTBEARING ALIGNMENT:    ROM:   (Degrees) Left AROM Left PROM  Right AROM Right PROM   Ankle Dorsiflexion WFL B  Min loss    Ankle Plantarflexion WFL B  WFL B    Ankle Inversion WFL B      Ankle Eversion WFLB       Great Toe Flexion WFL B      Great Toe Extension WFL   Min loss    Pain: No pain with AROM and PROM stretch, reports calf restriction into R foot and ankle   End feel: Firm     STRENGTH: WNL  4/5 MMT B  FLEXIBILITY:  limited into great toe extension and ankle DF  SPECIAL TESTS:  negative special tests  FUNCTIONAL TESTS:  WNL  PALPATION:  none   JOINT MOBILITY:  hypomobile midfoot B and R mortise joint    Assessment & Plan   CLINICAL IMPRESSIONS  Medical Diagnosis: M25.571 (ICD-10-CM) - Pain in joint involving ankle and foot, right  M76.61 (ICD-10-CM) - Achilles tendinitis, right leg  E11.22, N18.31, Z79.4 (ICD-10-CM) - Type 2 diabetes mellitus with stage 3a chronic kidney disease, with long-term current use of insulin (H)    Treatment Diagnosis: R foot and ankle pain   Impression/Assessment: Patient is a 59 year old male with R ankle and foot pain complaints.  The following significant findings have been identified: Pain, Decreased ROM/flexibility, Decreased joint mobility, Decreased strength, Impaired balance, Decreased proprioception, Impaired sensation, Inflammation, Edema, Impaired gait, Impaired muscle performance, Decreased activity tolerance, Impaired posture, and Instability. These impairments interfere with their ability to perform self care tasks, work tasks, recreational activities, household chores, driving , household mobility, and community mobility as compared to previous level of function. Pt presents to PT with R foot and ankle pain starting 3 weeks prior with very high gout flare up. Pain very limiting causing ankle joint and great toe pain unable to stand and walk. Recieved CAM boot to offload  foot. PMH relevat for DM 2, hx of gout, mixed hyperlipidemia, kidney disease. Since then pain has improved though demonstrates decreased ankle ROM limiting pushoff and walking mechanics. Pt would benefit from couple sessions of skilled PT to improve function and strength.     Clinical Decision Making (Complexity):  Clinical Presentation: Stable/Uncomplicated  Clinical Presentation Rationale: based on medical and personal factors listed in PT evaluation  Clinical Decision Making (Complexity): Low complexity    PLAN OF CARE  Treatment Interventions:  Modalities: E-stim, Iontophoresis  Interventions: Gait Training, Manual Therapy, Neuromuscular Re-education, Therapeutic Activity, Therapeutic Exercise, Self-Care/Home Management    Long Term Goals     PT Goal 1  Goal Identifier: HEP  Goal Description: Patient will be independent in self-management of condition and HEP to reach functional goals.  Target Date: 06/25/25  PT Goal 2  Goal Identifier: Walking  Goal Description: Pt will be able to walk for 30+ minutes with pain level <3/10 for return to physical activity  Target Date: 06/25/25  PT Goal 3  Goal Identifier: Standing  Goal Description: Pt will be able to stand for 10+ minutes to be able to complete household chores and meal prep  Target Date: 06/25/25  PT Goal 4  Goal Identifier: Work  Goal Description: Pt will be able to continue back all work duties without pain  Target Date: 06/25/25      Frequency of Treatment: 1x/wk  Duration of Treatment: 12 weeks    Recommended Referrals to Other Professionals:   Education Assessment:   Learner/Method: Patient  Education Comments: Verbalizes understanding    Risks and benefits of evaluation/treatment have been explained.   Patient/Family/caregiver agrees with Plan of Care.     Evaluation Time:     PT Eval, Low Complexity Minutes (84313): 29       Signing Clinician: GEO SOTO, PT

## 2025-04-09 ENCOUNTER — VIRTUAL VISIT (OUTPATIENT)
Dept: INTERNAL MEDICINE | Facility: CLINIC | Age: 60
End: 2025-04-09
Payer: COMMERCIAL

## 2025-04-09 DIAGNOSIS — E11.22 TYPE 2 DIABETES MELLITUS WITH STAGE 3A CHRONIC KIDNEY DISEASE, WITH LONG-TERM CURRENT USE OF INSULIN (H): Primary | ICD-10-CM

## 2025-04-09 DIAGNOSIS — I10 PRIMARY HYPERTENSION: ICD-10-CM

## 2025-04-09 DIAGNOSIS — J30.2 SEASONAL ALLERGIC RHINITIS, UNSPECIFIED TRIGGER: ICD-10-CM

## 2025-04-09 DIAGNOSIS — N18.31 TYPE 2 DIABETES MELLITUS WITH STAGE 3A CHRONIC KIDNEY DISEASE, WITH LONG-TERM CURRENT USE OF INSULIN (H): Primary | ICD-10-CM

## 2025-04-09 DIAGNOSIS — Z79.4 TYPE 2 DIABETES MELLITUS WITH STAGE 3A CHRONIC KIDNEY DISEASE, WITH LONG-TERM CURRENT USE OF INSULIN (H): Primary | ICD-10-CM

## 2025-04-09 PROCEDURE — 98006 SYNCH AUDIO-VIDEO EST MOD 30: CPT | Performed by: INTERNAL MEDICINE

## 2025-04-09 RX ORDER — LISINOPRIL 5 MG/1
5 TABLET ORAL DAILY
Qty: 90 TABLET | Refills: 1 | Status: SHIPPED | OUTPATIENT
Start: 2025-04-09

## 2025-04-09 RX ORDER — CETIRIZINE HYDROCHLORIDE 10 MG/1
10 TABLET ORAL DAILY
Qty: 90 TABLET | Refills: 3 | Status: SHIPPED | OUTPATIENT
Start: 2025-04-09

## 2025-04-09 RX ORDER — METFORMIN HYDROCHLORIDE 500 MG/1
500 TABLET, EXTENDED RELEASE ORAL
Qty: 90 TABLET | Refills: 1 | Status: SHIPPED | OUTPATIENT
Start: 2025-04-09

## 2025-04-09 ASSESSMENT — ENCOUNTER SYMPTOMS: WHEEZING: 1

## 2025-04-09 NOTE — PROGRESS NOTES
"Tyrone is a 59 year old who is being evaluated via a billable video visit.    How would you like to obtain your AVS? MyChart  If the video visit is dropped, the invitation should be resent by: Send to e-mail at: [email or web address]  Will anyone else be joining your video visit? Yes: Dawson Cobian. How would they like to receive their invitation?       Assessment & Plan     (E11.22,  N18.31,  Z79.4) Type 2 diabetes mellitus with stage 3a chronic kidney disease, with long-term current use of insulin (H)  (primary encounter diagnosis)  Comment: above goal  Plan: metFORMIN (GLUCOPHAGE XR) 500 MG 24 hr tablet        Discussed that we will need to initiate medication for now. Kidney function should be adequate to allow for metformin. Low dose for now. Recheck next visit in summer.    (J30.2) Seasonal allergic rhinitis, unspecified trigger  Comment: ongoing, singular was not helpful  Plan: cetirizine (ZYRTEC) 10 MG tablet        Wants to go back to zyrtec.    (I10) Primary hypertension  Comment: reasonable control but with higher urine protein.  Plan: lisinopril (ZESTRIL) 5 MG tablet        Add ACE-I. Watch renal function closely.    CKD-stage 3a. Has been relatively stable. Never did schedule appointment with nephrology. We can watch for now, particularly with the addition of lisinopril.  Apparently passed another stone recently. No sx at this time. Last urology appointment was 12/2023, 24 hour urine analysis ordered, uncertain if this was completed.  Recommended that son call the urology office to ask next steps.    See me this June, repeat labs.    The longitudinal plan of care for the diagnosis(es)/condition(s) as documented were addressed during this visit. Due to the added complexity in care, I will continue to support Tyrone in the subsequent management and with ongoing continuity of care.       BMI  Estimated body mass index is 37.03 kg/m  as calculated from the following:    Height as of 3/26/25: 1.6 m (5' 2.99\").    " Weight as of 3/17/25: 94.8 kg (209 lb).             Baltazar Hansen is a 59 year old, presenting for the following health issues:  Diabetes (1 month follow up on diabetes and blood pressure ) and Allergies (Pt reports that singular doesn't help with Sx's; he is wondering if PCP can prescribe something else )      4/9/2025     8:01 AM   Additional Questions   Roomed by Anayeli   Accompanied by Dawson Cobian - son         4/9/2025     8:01 AM   Patient Reported Additional Medications   Patient reports taking the following new medications none     Allergies    History of Present Illness       Diabetes:   He presents for follow up of diabetes.    He is not checking blood glucose.         He has no concerns regarding his diabetes at this time.   He is not experiencing numbness or burning in feet, excessive thirst, blurry vision, weight changes or redness, sores or blisters on feet.           Reason for visit:  Diabetes & follow up from labs in March    He eats 2-3 servings of fruits and vegetables daily.He consumes 0 sweetened beverage(s) daily.He exercises with enough effort to increase his heart rate 10 to 19 minutes per day.  He exercises with enough effort to increase his heart rate 4 days per week.   He is taking medications regularly.                    Objective           Vitals:  No vitals were obtained today due to virtual visit.    Physical Exam   GENERAL: alert and no distress  EYES: Eyes grossly normal to inspection.  No discharge or erythema, or obvious scleral/conjunctival abnormalities.  RESP: No audible wheeze, cough, or visible cyanosis.    SKIN: Visible skin clear. No significant rash, abnormal pigmentation or lesions.  NEURO: Cranial nerves grossly intact.  Mentation and speech appropriate for age.  PSYCH: Appropriate affect, tone, and pace of words          Video-Visit Details    Type of service:  Video Visit   Originating Location (pt. Location): Home    Distant Location (provider location):   On-site  Platform used for Video Visit: Angelica  Signed Electronically by: Oleg Bañuelos MD

## 2025-04-16 ENCOUNTER — THERAPY VISIT (OUTPATIENT)
Dept: PHYSICAL THERAPY | Facility: REHABILITATION | Age: 60
End: 2025-04-16
Payer: COMMERCIAL

## 2025-04-16 DIAGNOSIS — M25.571 PAIN IN JOINT INVOLVING ANKLE AND FOOT, RIGHT: Primary | ICD-10-CM

## 2025-04-16 DIAGNOSIS — M76.61 ACHILLES TENDINITIS, RIGHT LEG: ICD-10-CM

## 2025-04-16 PROCEDURE — 97110 THERAPEUTIC EXERCISES: CPT | Mod: GP

## 2025-04-16 PROCEDURE — 97140 MANUAL THERAPY 1/> REGIONS: CPT | Mod: GP

## 2025-06-12 ENCOUNTER — RESULTS FOLLOW-UP (OUTPATIENT)
Dept: INTERNAL MEDICINE | Facility: CLINIC | Age: 60
End: 2025-06-12

## 2025-06-12 ENCOUNTER — OFFICE VISIT (OUTPATIENT)
Dept: INTERNAL MEDICINE | Facility: CLINIC | Age: 60
End: 2025-06-12
Payer: COMMERCIAL

## 2025-06-12 VITALS
WEIGHT: 200.7 LBS | SYSTOLIC BLOOD PRESSURE: 117 MMHG | BODY MASS INDEX: 34.27 KG/M2 | OXYGEN SATURATION: 97 % | HEIGHT: 64 IN | TEMPERATURE: 97.7 F | HEART RATE: 57 BPM | DIASTOLIC BLOOD PRESSURE: 75 MMHG

## 2025-06-12 DIAGNOSIS — J45.30 MILD PERSISTENT ASTHMA, UNSPECIFIED WHETHER COMPLICATED: ICD-10-CM

## 2025-06-12 DIAGNOSIS — Z79.4 TYPE 2 DIABETES MELLITUS WITH STAGE 3A CHRONIC KIDNEY DISEASE, WITH LONG-TERM CURRENT USE OF INSULIN (H): Primary | ICD-10-CM

## 2025-06-12 DIAGNOSIS — E11.22 TYPE 2 DIABETES MELLITUS WITH STAGE 3A CHRONIC KIDNEY DISEASE, WITH LONG-TERM CURRENT USE OF INSULIN (H): Primary | ICD-10-CM

## 2025-06-12 DIAGNOSIS — I10 PRIMARY HYPERTENSION: ICD-10-CM

## 2025-06-12 DIAGNOSIS — E78.2 MIXED HYPERLIPIDEMIA: ICD-10-CM

## 2025-06-12 DIAGNOSIS — N18.31 TYPE 2 DIABETES MELLITUS WITH STAGE 3A CHRONIC KIDNEY DISEASE, WITH LONG-TERM CURRENT USE OF INSULIN (H): Primary | ICD-10-CM

## 2025-06-12 LAB
EST. AVERAGE GLUCOSE BLD GHB EST-MCNC: 137 MG/DL
HBA1C MFR BLD: 6.4 % (ref 0–5.6)

## 2025-06-12 RX ORDER — BUDESONIDE AND FORMOTEROL FUMARATE DIHYDRATE 160; 4.5 UG/1; UG/1
1 AEROSOL RESPIRATORY (INHALATION) 2 TIMES DAILY
Qty: 10.2 G | Refills: 5 | Status: SHIPPED | OUTPATIENT
Start: 2025-06-12

## 2025-06-12 ASSESSMENT — PAIN SCALES - GENERAL: PAINLEVEL_OUTOF10: NO PAIN (0)

## 2025-06-12 NOTE — PROGRESS NOTES
"  Assessment & Plan     (E11.22,  N18.31,  Z79.4) Type 2 diabetes mellitus with stage 3a chronic kidney disease, with long-term current use of insulin (H)  (primary encounter diagnosis)  Comment: added metformin at last visit due to A1c above 7%, tolerating well  Plan: HEMOGLOBIN A1C        Will recheck labs, suspect this will look better as home readings have improved.  See me again in 4 months.    (E78.2) Mixed hyperlipidemia  Comment: on statin  Plan: Will plan to continue on previous medication without changes     (I10) Primary hypertension  Comment: improved on medication  Plan: Will plan to continue on previous medication without changes     (J45.30) Mild persistent asthma, unspecified whether complicated  Comment: stable overall, needs refill  Plan: budesonide-formoterol (SYMBICORT) 160-4.5         MCG/ACT inhaler        Will plan to continue on previous medication without changes     See in 4 months for DM recheck    The longitudinal plan of care for the diagnosis(es)/condition(s) as documented were addressed during this visit. Due to the added complexity in care, I will continue to support Tyrone in the subsequent management and with ongoing continuity of care.           BMI  Estimated body mass index is 34.45 kg/m  as calculated from the following:    Height as of this encounter: 1.626 m (5' 4\").    Weight as of this encounter: 91 kg (200 lb 11.2 oz).             Baltazar Hansen is a 59 year old, presenting for the following health issues:  office visit (Pt is here for 3 month follow up appt. Refill on symbicort inhaler. )        6/12/2025     9:49 AM   Additional Questions   Roomed by Gaurav   Accompanied by alone     History of Present Illness       Reason for visit:  3 month follow up    He eats 0-1 servings of fruits and vegetables daily.He consumes 1 sweetened beverage(s) daily.He exercises with enough effort to increase his heart rate 10 to 19 minutes per day.  He exercises with enough effort to increase " "his heart rate 3 or less days per week.   He is taking medications regularly.                      Objective    /75 (BP Location: Left arm, Patient Position: Sitting, Cuff Size: Adult Regular)   Pulse 57   Temp 97.7  F (36.5  C) (Temporal)   Ht 1.626 m (5' 4\")   Wt 91 kg (200 lb 11.2 oz)   SpO2 97%   BMI 34.45 kg/m    Body mass index is 34.45 kg/m .  Physical Exam               Signed Electronically by: Oleg Bañuelos MD    "

## 2025-06-12 NOTE — NURSING NOTE
Patient received Prevnar 20 vaccine at today's appointment. Prior to immunization administration, verified patients identity using patient s name and date of birth. Please see Immunization Activity for additional information.     Screening Questionnaire for Adult Immunization    Are you sick today?   No   Do you have allergies to medications, food, a vaccine component or latex?   No   Have you ever had a serious reaction after receiving a vaccination?   No   Do you have a long-term health problem with heart, lung, kidney, or metabolic disease (e.g., diabetes), asthma, a blood disorder, no spleen, complement component deficiency, a cochlear implant, or a spinal fluid leak?  Are you on long-term aspirin therapy?   Yes   Do you have cancer, leukemia, HIV/AIDS, or any other immune system problem?   No   Do you have a parent, brother, or sister with an immune system problem?   No   In the past 3 months, have you taken medications that affect  your immune system, such as prednisone, other steroids, or anticancer drugs; drugs for the treatment of rheumatoid arthritis, Crohn s disease, or psoriasis; or have you had radiation treatments?   No   Have you had a seizure, or a brain or other nervous system problem?   No   During the past year, have you received a transfusion of blood or blood    products, or been given immune (gamma) globulin or antiviral drug?   No   For women: Are you pregnant or is there a chance you could become       pregnant during the next month?   No   Have you received any vaccinations in the past 4 weeks?   No     Immunization questionnaire was positive for at least one answer.  PCP aware of patient's DM2 diagnosis. Additionally, patient denies history of cardiac problems following dose of Covid shot, and has no history of MIS-A/MIS-C following Covid infection.      Patient instructed to remain in clinic for 15 minutes afterwards, and to report any adverse reactions.     Screening performed by Amara GRAVES  ALEXANDER Lopez on 6/12/2025 at 11:02 AM.

## 2025-07-03 DIAGNOSIS — I10 ESSENTIAL HYPERTENSION: ICD-10-CM

## 2025-07-03 RX ORDER — ATENOLOL 50 MG/1
50 TABLET ORAL DAILY
Qty: 90 TABLET | Refills: 2 | Status: SHIPPED | OUTPATIENT
Start: 2025-07-03

## 2025-08-06 DIAGNOSIS — E78.2 MIXED HYPERLIPIDEMIA: ICD-10-CM

## 2025-08-07 RX ORDER — ATORVASTATIN CALCIUM 20 MG/1
20 TABLET, FILM COATED ORAL DAILY
Qty: 90 TABLET | Refills: 0 | Status: SHIPPED | OUTPATIENT
Start: 2025-08-07